# Patient Record
Sex: FEMALE | Race: WHITE | Employment: FULL TIME | URBAN - NONMETROPOLITAN AREA
[De-identification: names, ages, dates, MRNs, and addresses within clinical notes are randomized per-mention and may not be internally consistent; named-entity substitution may affect disease eponyms.]

---

## 2017-06-05 ENCOUNTER — OFFICE VISIT (OUTPATIENT)
Dept: FAMILY MEDICINE CLINIC | Age: 52
End: 2017-06-05
Payer: COMMERCIAL

## 2017-06-05 VITALS
DIASTOLIC BLOOD PRESSURE: 70 MMHG | RESPIRATION RATE: 12 BRPM | HEART RATE: 56 BPM | BODY MASS INDEX: 20.81 KG/M2 | HEIGHT: 67 IN | SYSTOLIC BLOOD PRESSURE: 122 MMHG | WEIGHT: 132.6 LBS

## 2017-06-05 DIAGNOSIS — R87.613 HGSIL (HIGH GRADE SQUAMOUS INTRAEPITHELIAL LESION) ON PAP SMEAR OF CERVIX: ICD-10-CM

## 2017-06-05 DIAGNOSIS — F33.42 RECURRENT MAJOR DEPRESSIVE DISORDER, IN FULL REMISSION (HCC): Primary | ICD-10-CM

## 2017-06-05 DIAGNOSIS — K58.0 IRRITABLE BOWEL SYNDROME WITH DIARRHEA: ICD-10-CM

## 2017-06-05 DIAGNOSIS — I83.93 VARICOSE VEINS OF BOTH LOWER EXTREMITIES: ICD-10-CM

## 2017-06-05 DIAGNOSIS — F41.9 ANXIETY: ICD-10-CM

## 2017-06-05 PROCEDURE — 99214 OFFICE O/P EST MOD 30 MIN: CPT | Performed by: FAMILY MEDICINE

## 2017-06-05 ASSESSMENT — PATIENT HEALTH QUESTIONNAIRE - PHQ9
SUM OF ALL RESPONSES TO PHQ QUESTIONS 1-9: 0
SUM OF ALL RESPONSES TO PHQ9 QUESTIONS 1 & 2: 0
2. FEELING DOWN, DEPRESSED OR HOPELESS: 0
1. LITTLE INTEREST OR PLEASURE IN DOING THINGS: 0

## 2017-06-05 ASSESSMENT — ENCOUNTER SYMPTOMS
EYES NEGATIVE: 1
RESPIRATORY NEGATIVE: 1
GASTROINTESTINAL NEGATIVE: 1
ALLERGIC/IMMUNOLOGIC NEGATIVE: 1

## 2017-06-13 ENCOUNTER — OFFICE VISIT (OUTPATIENT)
Dept: OPTOMETRY | Age: 52
End: 2017-06-13
Payer: COMMERCIAL

## 2017-06-13 DIAGNOSIS — H00.014 HORDEOLUM EXTERNUM OF LEFT UPPER EYELID: Primary | ICD-10-CM

## 2017-06-13 DIAGNOSIS — H52.4 MYOPIA OF BOTH EYES WITH ASTIGMATISM AND PRESBYOPIA: ICD-10-CM

## 2017-06-13 DIAGNOSIS — H52.13 MYOPIA OF BOTH EYES WITH ASTIGMATISM AND PRESBYOPIA: ICD-10-CM

## 2017-06-13 DIAGNOSIS — H52.203 MYOPIA OF BOTH EYES WITH ASTIGMATISM AND PRESBYOPIA: ICD-10-CM

## 2017-06-13 PROCEDURE — 99212 OFFICE O/P EST SF 10 MIN: CPT | Performed by: OPTOMETRIST

## 2017-06-13 RX ORDER — BENOXINATE HCL/FLUORESCEIN SOD 0.4%-0.25%
1 DROPS OPHTHALMIC (EYE) ONCE
Status: COMPLETED | OUTPATIENT
Start: 2017-06-13 | End: 2017-06-13

## 2017-06-13 RX ADMIN — Medication 1 DROP: at 16:19

## 2017-06-13 ASSESSMENT — TONOMETRY
IOP_METHOD: APPLANATION W FLURESS DROP
OS_IOP_MMHG: 16
OD_IOP_MMHG: 16

## 2017-06-13 ASSESSMENT — SLIT LAMP EXAM - LIDS: COMMENTS: NORMAL

## 2017-06-13 ASSESSMENT — REFRACTION_MANIFEST
OD_CYLINDER: -0.25
OS_SPHERE: PLANO
OD_ADD: +2.00
OD_AXIS: 135
OS_CYLINDER: -1.00
OS_ADD: +2.00
OS_AXIS: 175
OD_SPHERE: -0.25

## 2017-06-13 ASSESSMENT — VISUAL ACUITY
CORRECTION_TYPE: GLASSES
OS_CC: 20/20
OD_CC: 20/20 OU
METHOD: SNELLEN - LINEAR
OS_CC+: -2

## 2017-06-13 ASSESSMENT — REFRACTION_WEARINGRX
SPECS_TYPE: PAL
OD_CYLINDER: -0.25
OD_AXIS: 127
OD_SPHERE: -0.25
OS_SPHERE: PLANO
OD_ADD: +2.00
OS_AXIS: 178
OS_CYLINDER: -1.00
OS_ADD: +2.00

## 2017-08-14 ENCOUNTER — TELEPHONE (OUTPATIENT)
Dept: OBGYN | Age: 52
End: 2017-08-14

## 2017-10-18 ENCOUNTER — OFFICE VISIT (OUTPATIENT)
Dept: OBGYN | Age: 52
End: 2017-10-18
Payer: COMMERCIAL

## 2017-10-18 VITALS
HEART RATE: 68 BPM | SYSTOLIC BLOOD PRESSURE: 118 MMHG | WEIGHT: 130.8 LBS | DIASTOLIC BLOOD PRESSURE: 84 MMHG | BODY MASS INDEX: 20.53 KG/M2 | HEIGHT: 67 IN

## 2017-10-18 DIAGNOSIS — Z12.31 SCREENING MAMMOGRAM, ENCOUNTER FOR: ICD-10-CM

## 2017-10-18 DIAGNOSIS — Z01.419 WELL FEMALE EXAM WITH ROUTINE GYNECOLOGICAL EXAM: Primary | ICD-10-CM

## 2017-10-18 DIAGNOSIS — Z78.0 MENOPAUSE: ICD-10-CM

## 2017-10-18 DIAGNOSIS — Z13.220 SCREENING FOR LIPOID DISORDERS: ICD-10-CM

## 2017-10-18 DIAGNOSIS — N95.2 VAGINAL ATROPHY: ICD-10-CM

## 2017-10-18 DIAGNOSIS — Z13.29 SCREENING FOR THYROID DISORDER: ICD-10-CM

## 2017-10-18 PROCEDURE — 99396 PREV VISIT EST AGE 40-64: CPT | Performed by: NURSE PRACTITIONER

## 2017-10-18 NOTE — PROGRESS NOTES
Rowan Acuna  10/18/2017              45 y.o. Chief Complaint   Patient presents with    Gynecologic Exam     annual exam and pap         No LMP recorded (lmp unknown). Patient has had a hysterectomy. No referring provider defined for this encounter. HPI :Annual Exam  Patient presents for annual exam.  Counseling on healthy lifestyle reviewed, as well as the need for self breast exam. We reviewed the need for Kegal exercises. We discussed and reviewed the need for routine screenings and immunization updates when appropriate. Vaginal dryness and urge incontinence becoming more of a problem. Performs monthly self breast exams. The patient is sexually active. last pap: was normal, last mammogram: was normal  The patient has regular exercise: 3-4 times per week . We did review the need for and frequency of both weight bearing and strengthening as tolerated by the patient. ________________________________________________________________________  Obstetric History       T3      L2     SAB0   TAB0   Ectopic0   Molar0   Multiple0   Live Births3       # Outcome Date GA Lbr Judd/2nd Weight Sex Delivery Anes PTL Lv   3 Term 01 40w0d  6 lb 8 oz (2.948 kg) F Vag-Spont   DAVIDSON      Name: Ramu Garcia   2 Term 84 40w0d  9 lb 6 oz (4.252 kg) M Vag-Spont   DEC      Name: Charisse [employer]   1 Term 02/15/81 40w0d  8 lb 8 oz (3.856 kg) F Vag-Spont   DAVIDSON      Name: diane        Past Medical History:   Diagnosis Date    Basal cell carcinoma     right shoulder    Depression     IBS (irritable bowel syndrome)     Low grade squamous intraepithelial lesion     converting to HGSIL 5/15.     Migraine     PTSD (post-traumatic stress disorder)     Rectocele     Varicose vein                                                                    Past Surgical History:   Procedure Laterality Date    CHOLECYSTECTOMY, LAPAROSCOPIC      COLONOSCOPY  2012    COLONOSCOPY  2014    internal hemorrhoids Dr Nikkie Nance  01/29/2010    ENDOMETRIAL ABLATION      HYSTERECTOMY, TOTAL ABDOMINAL  08/20/2015    Due to pap with HGSIL    LEEP      OTHER SURGICAL HISTORY      Schlerotherapy    POSTERIOR CRUCIATE LIGAMENT REPAIR      RECTOCELE REPAIR  2013    UPPER GASTROINTESTINAL ENDOSCOPY  02/15/2006     Family History   Problem Relation Age of Onset    Glaucoma Neg Hx     Diabetes Neg Hx     Cataracts Neg Hx      Social History     Social History    Marital status:      Spouse name: N/A    Number of children: N/A    Years of education: N/A     Occupational History    Not on file. Social History Main Topics    Smoking status: Never Smoker    Smokeless tobacco: Never Used    Alcohol use 0.0 oz/week      Comment: SOCIALLY    Drug use: No    Sexual activity: Yes     Partners: Male     Other Topics Concern    Not on file     Social History Narrative    No narrative on file       MEDICATIONS:  Current Outpatient Prescriptions   Medication Sig Dispense Refill    escitalopram (LEXAPRO) 10 MG tablet TAKE 1 TABLET DAILY 90 tablet 3     No current facility-administered medications for this visit. ALLERGIES:  Allergies as of 10/18/2017 - Review Complete 10/18/2017   Allergen Reaction Noted    Penicillins Rash 09/10/2013           Gynecologic History:  Menarche: 13 yo  Menopause at 50's yo     No LMP recorded (lmp unknown). Patient has had a hysterectomy. Hormone Exposure: No    Family History of Breast, Ovarian , Colon or Uterine Cancer: No     Preventative Health Testing:  Date of Last Pap Smear: 2016  Date of Last Mammogram: 2016  Date of Last Colonoscopy: 2014  Date of Last Bone Density: none  ________________________________________________________________________  REVIEW OF SYSTEMS:     A minimum of an eleven point review of systems was completed.     Review Of Systems (11 point):  General ROS:  negative  Hematological and Lymphatic (post-traumatic stress disorder)     Rectocele     Varicose vein          Patient Active Problem List   Diagnosis    Migraine headache    Dysmenorrhea    Depression    Post traumatic stress disorder (PTSD)    LSIL (low grade squamous intraepithelial lesion) on Pap smear    Rectocele    Venous insufficiency (chronic) (peripheral)    Spider vein, symptomatic    Myopia of both eyes with astigmatism and presbyopia    Varicose veins of both lower extremities    IBS (irritable bowel syndrome)          Hereditary Breast, Ovarian, Colon and Uterine Cancer screening Done. Tobacco & Secondary smoke risks reviewed; instructed on cessation and avoidance    PLAN:  No Follow-up on file. Repeat pap per ASCCP 2013 guidelines  Return for annual exams  Mammograms every 1 year. If 35 yo and last mammogram was negative. Routine health maintenance per patients PCP.   Orders Placed This Encounter   Procedures    GILLIAN DIGITAL SCREEN W CAD BILATERAL     Standing Status:   Future     Standing Expiration Date:   5/18/2019     Order Specific Question:   Reason for exam:     Answer:   screening    DEXA Bone Density 2 Sites     Standing Status:   Future     Standing Expiration Date:   4/18/2019     Order Specific Question:   Reason for exam:     Answer:   menopause    Vitamin D 25 Hydroxy     Standing Status:   Future     Standing Expiration Date:   10/18/2018    CBC Auto Differential     Standing Status:   Future     Standing Expiration Date:   1/26/2019    Comprehensive Metabolic Panel     Standing Status:   Future     Standing Expiration Date:   1/26/2019    Lipid Panel     Standing Status:   Future     Standing Expiration Date:   1/26/2019     Order Specific Question:   Is Patient Fasting?/# of Hours     Answer:   yes    TSH with Reflex     Standing Status:   Future     Standing Expiration Date:   1/26/2019       Bebo Buenrostro  10/18/2017      (Please note that portions of this note were completed with a

## 2017-10-21 ENCOUNTER — HOSPITAL ENCOUNTER (OUTPATIENT)
Dept: LAB | Age: 52
Setting detail: SPECIMEN
Discharge: HOME | End: 2017-10-21
Payer: COMMERCIAL

## 2017-10-21 DIAGNOSIS — Z13.220 SCREENING FOR LIPOID DISORDERS: ICD-10-CM

## 2017-10-21 DIAGNOSIS — Z13.29 SCREENING FOR THYROID DISORDER: ICD-10-CM

## 2017-10-21 DIAGNOSIS — Z01.419 WELL FEMALE EXAM WITH ROUTINE GYNECOLOGICAL EXAM: ICD-10-CM

## 2017-10-21 LAB
ABSOLUTE EOS #: 0 K/UL (ref 0–0.4)
ABSOLUTE IMMATURE GRANULOCYTE: ABNORMAL K/UL (ref 0–0.3)
ABSOLUTE LYMPH #: 1.1 K/UL (ref 1–4.8)
ABSOLUTE MONO #: 0.2 K/UL (ref 0.1–1.2)
ALBUMIN SERPL-MCNC: 4.7 G/DL (ref 3.5–5.2)
ALBUMIN/GLOBULIN RATIO: 1.7 (ref 1–2.5)
ALP BLD-CCNC: 91 U/L (ref 35–104)
ALT SERPL-CCNC: 20 U/L (ref 5–33)
ANION GAP SERPL CALCULATED.3IONS-SCNC: 16 MMOL/L (ref 9–17)
AST SERPL-CCNC: 23 U/L
BASOPHILS # BLD: 1 % (ref 0–1)
BASOPHILS ABSOLUTE: 0 K/UL (ref 0–0.2)
BILIRUB SERPL-MCNC: 0.47 MG/DL (ref 0.3–1.2)
BUN BLDV-MCNC: 15 MG/DL (ref 6–20)
BUN/CREAT BLD: 16 (ref 9–20)
CALCIUM SERPL-MCNC: 9.8 MG/DL (ref 8.6–10.4)
CHLORIDE BLD-SCNC: 104 MMOL/L (ref 98–107)
CHOLESTEROL/HDL RATIO: 2.4
CHOLESTEROL: 220 MG/DL
CO2: 26 MMOL/L (ref 20–31)
CREAT SERPL-MCNC: 0.94 MG/DL (ref 0.5–0.9)
DIFFERENTIAL TYPE: ABNORMAL
EOSINOPHILS RELATIVE PERCENT: 1 % (ref 1–7)
GFR AFRICAN AMERICAN: >60 ML/MIN
GFR NON-AFRICAN AMERICAN: >60 ML/MIN
GFR SERPL CREATININE-BSD FRML MDRD: ABNORMAL ML/MIN/{1.73_M2}
GFR SERPL CREATININE-BSD FRML MDRD: ABNORMAL ML/MIN/{1.73_M2}
GLUCOSE BLD-MCNC: 89 MG/DL (ref 70–99)
HCT VFR BLD CALC: 41.5 % (ref 36–46)
HDLC SERPL-MCNC: 90 MG/DL
HEMOGLOBIN: 14 G/DL (ref 12–16)
IMMATURE GRANULOCYTES: ABNORMAL %
LDL CHOLESTEROL: 117 MG/DL (ref 0–130)
LYMPHOCYTES # BLD: 36 % (ref 16–46)
MCH RBC QN AUTO: 31 PG (ref 26–34)
MCHC RBC AUTO-ENTMCNC: 33.9 G/DL (ref 31–37)
MCV RBC AUTO: 91.7 FL (ref 80–100)
MONOCYTES # BLD: 7 % (ref 4–11)
PDW BLD-RTO: 12.8 % (ref 11–14.5)
PLATELET # BLD: 207 K/UL (ref 140–450)
PLATELET ESTIMATE: ABNORMAL
PMV BLD AUTO: 9.4 FL (ref 6–12)
POTASSIUM SERPL-SCNC: 4.5 MMOL/L (ref 3.7–5.3)
RBC # BLD: 4.52 M/UL (ref 4–5.2)
RBC # BLD: ABNORMAL 10*6/UL
SEG NEUTROPHILS: 55 % (ref 43–77)
SEGMENTED NEUTROPHILS ABSOLUTE COUNT: 1.7 K/UL (ref 1.8–7.7)
SODIUM BLD-SCNC: 146 MMOL/L (ref 135–144)
TOTAL PROTEIN: 7.4 G/DL (ref 6.4–8.3)
TRIGL SERPL-MCNC: 67 MG/DL
TSH SERPL DL<=0.05 MIU/L-ACNC: 2.35 MIU/L (ref 0.3–5)
VITAMIN D 25-HYDROXY: 26 NG/ML (ref 30–100)
VLDLC SERPL CALC-MCNC: ABNORMAL MG/DL (ref 1–30)
WBC # BLD: 3.1 K/UL (ref 3.5–11)
WBC # BLD: ABNORMAL 10*3/UL

## 2017-10-21 PROCEDURE — 84443 ASSAY THYROID STIM HORMONE: CPT

## 2017-10-21 PROCEDURE — 80053 COMPREHEN METABOLIC PANEL: CPT

## 2017-10-21 PROCEDURE — 36415 COLL VENOUS BLD VENIPUNCTURE: CPT

## 2017-10-21 PROCEDURE — 80061 LIPID PANEL: CPT

## 2017-10-21 PROCEDURE — 85025 COMPLETE CBC W/AUTO DIFF WBC: CPT

## 2017-10-21 PROCEDURE — 82306 VITAMIN D 25 HYDROXY: CPT

## 2017-10-22 DIAGNOSIS — E55.9 VITAMIN D DEFICIENCY: Primary | ICD-10-CM

## 2017-12-05 ENCOUNTER — OFFICE VISIT (OUTPATIENT)
Dept: OBGYN | Age: 52
End: 2017-12-05
Payer: COMMERCIAL

## 2017-12-05 ENCOUNTER — HOSPITAL ENCOUNTER (OUTPATIENT)
Age: 52
Setting detail: SPECIMEN
Discharge: HOME | End: 2017-12-05
Payer: COMMERCIAL

## 2017-12-05 VITALS
DIASTOLIC BLOOD PRESSURE: 70 MMHG | WEIGHT: 130 LBS | HEIGHT: 67 IN | BODY MASS INDEX: 20.4 KG/M2 | HEART RATE: 64 BPM | SYSTOLIC BLOOD PRESSURE: 108 MMHG

## 2017-12-05 DIAGNOSIS — N95.2 VAGINAL ATROPHY: ICD-10-CM

## 2017-12-05 DIAGNOSIS — Z12.72 SCREENING FOR VAGINAL CANCER: ICD-10-CM

## 2017-12-05 DIAGNOSIS — Z12.72 SCREENING FOR VAGINAL CANCER: Primary | ICD-10-CM

## 2017-12-05 PROCEDURE — 99213 OFFICE O/P EST LOW 20 MIN: CPT | Performed by: NURSE PRACTITIONER

## 2017-12-05 PROCEDURE — G0145 SCR C/V CYTO,THINLAYER,RESCR: HCPCS

## 2017-12-05 PROCEDURE — 87624 HPV HI-RISK TYP POOLED RSLT: CPT

## 2017-12-05 RX ORDER — ESTRADIOL 0.1 MG/G
CREAM VAGINAL
Qty: 1 TUBE | Refills: 3 | Status: SHIPPED | OUTPATIENT
Start: 2017-12-05 | End: 2018-12-10 | Stop reason: SDUPTHER

## 2017-12-05 NOTE — PROGRESS NOTES
Subjective:  Gumaro Chopra presents for follow up of Estrace vaginal cream and cytology sampling. Vaginal atrophy noted at annual exam.  Patient also complained of vaginal dryness. Has been using estrace cream 1 gm twice weekly and has noted improvement in dryness. Denies side effects or warning signs and wishes to continue with the same. We reviewed the pros, cons, risks, benefits, side effects and proper compliance. Patient verbalized understanding. Patient Active Problem List   Diagnosis    Migraine headache    Dysmenorrhea    Depression    Post traumatic stress disorder (PTSD)    LSIL (low grade squamous intraepithelial lesion) on Pap smear    Rectocele    Venous insufficiency (chronic) (peripheral)    Spider vein, symptomatic    Myopia of both eyes with astigmatism and presbyopia    Varicose veins of both lower extremities    IBS (irritable bowel syndrome)     Problem list reviewed as part of this encounter. Medication list reviewed and updated. See nursing note. History of present illness reviewed in detail and expanded by me. REVIEW OF SYSTEMS:     A minimum of an eleven point review of systems was completed. Review Of Systems (11 point):  General ROS:  negative  Hematological and Lymphatic ROS:negative   Breast ROS: negative  Cardiovascular ROS: negative  Respiratory ROS: negative   Gastrointestinal ROS: negative  Genito-Urinary ROS: positive for vaginal atrophy  Psychological ROS: negative  Neurological ROS: negative  Musculoskeletal ROS: negative  Dermatological ROS: negative                                                                                                                                          Objective:  Vitals:    12/05/17 1255   BP: 108/70   Pulse: 64     Alert and oriented. Abdomen: Soft, non-tender, no masses.  No CVA tenderness  External Genetalia: Normal appearance of vulva,  Bartholin Glands, urethra, and Oviedo's ducts  Vagina: Normal appearance of mucosa and rugae. No unusual discharge. Atrophy markedly improved  Cervix: Surgically absent  Uterus: Surgically absent  Bimanual exam not repeated  Thin prep Pap obtained from vaginal apex         Assessment:  Encounter Diagnosis   Name Primary?  Cervical cancer screening Yes       Plan:  See orders. Orders Placed This Encounter   Procedures    PAP SMEAR     Patient History:    No LMP recorded (lmp unknown). Patient has had a hysterectomy. OBGYN Status: Hysterectomy  Past Surgical History:  2006: CHOLECYSTECTOMY, LAPAROSCOPIC  06/22/2012: COLONOSCOPY  8/25/2014: COLONOSCOPY      Comment: internal hemorrhoids Dr Sharda Massey [facility]  01/29/2010: [address] [facility]  No date: ENDOMETRIAL ABLATION  08/20/2015: HYSTERECTOMY, TOTAL ABDOMINAL      Comment: Due to pap with HGSIL  No date: LEEP  No date: OTHER SURGICAL HISTORY      Comment: Schlerotherapy  No date: POSTERIOR CRUCIATE LIGAMENT REPAIR  2013: Jamel 72  02/15/2006: UPPER GASTROINTESTINAL ENDOSCOPY      Smoking status: Never Smoker                                                              Smokeless tobacco: Never Used                           Standing Status:   Future     Standing Expiration Date:   12/5/2018     Order Specific Question:   Collection Type     Answer: Thin Prep     Order Specific Question:   Prior Abnormal Pap Test     Answer:   No     Order Specific Question:   Screening or Diagnostic     Answer:   Screening     Order Specific Question:   HPV Requested? Answer:   Yes - If Abnormal Reflex HPV     Order Specific Question:   High Risk Patient     Answer:   N/A     New Prescriptions    ESTRADIOL (ESTRACE VAGINAL) 0.1 MG/GM VAGINAL CREAM    Apply one gm twice weekly per vagina at bedtime     There are no Patient Instructions on file for this visit.     (Please note that portions of this note were completed with a voice-recognition program. Efforts were made to edit the dictations but occasionally words are

## 2017-12-13 LAB
CYTOLOGY REPORT: NORMAL
HPV SAMPLE: ABNORMAL
HPV SOURCE: ABNORMAL
HPV, GENOTYPE 16: NOT DETECTED
HPV, GENOTYPE 18: NOT DETECTED
HPV, HIGH RISK OTHER: DETECTED
HPV, INTERPRETATION: ABNORMAL

## 2017-12-28 ENCOUNTER — HOSPITAL ENCOUNTER (OUTPATIENT)
Dept: BONE DENSITY | Age: 52
Discharge: HOME | End: 2017-12-28
Payer: COMMERCIAL

## 2017-12-28 DIAGNOSIS — Z78.0 MENOPAUSE: ICD-10-CM

## 2017-12-28 PROCEDURE — 77080 DXA BONE DENSITY AXIAL: CPT

## 2018-01-02 ENCOUNTER — HOSPITAL ENCOUNTER (OUTPATIENT)
Dept: MAMMOGRAPHY | Age: 53
Discharge: HOME | End: 2018-01-02
Payer: COMMERCIAL

## 2018-01-02 ENCOUNTER — APPOINTMENT (OUTPATIENT)
Dept: BONE DENSITY | Age: 53
End: 2018-01-02
Payer: COMMERCIAL

## 2018-01-02 DIAGNOSIS — Z12.31 SCREENING MAMMOGRAM, ENCOUNTER FOR: ICD-10-CM

## 2018-01-02 PROCEDURE — 77063 BREAST TOMOSYNTHESIS BI: CPT

## 2018-01-22 ENCOUNTER — OFFICE VISIT (OUTPATIENT)
Dept: FAMILY MEDICINE CLINIC | Age: 53
End: 2018-01-22
Payer: COMMERCIAL

## 2018-01-22 VITALS
HEIGHT: 67 IN | DIASTOLIC BLOOD PRESSURE: 68 MMHG | WEIGHT: 131 LBS | SYSTOLIC BLOOD PRESSURE: 110 MMHG | HEART RATE: 64 BPM | BODY MASS INDEX: 20.56 KG/M2

## 2018-01-22 DIAGNOSIS — R87.613 HGSIL (HIGH GRADE SQUAMOUS INTRAEPITHELIAL LESION) ON PAP SMEAR OF CERVIX: ICD-10-CM

## 2018-01-22 DIAGNOSIS — K55.1 SUPERIOR MESENTERIC ARTERY SYNDROME (HCC): ICD-10-CM

## 2018-01-22 DIAGNOSIS — R31.21 ASYMPTOMATIC MICROSCOPIC HEMATURIA: Primary | ICD-10-CM

## 2018-01-22 PROCEDURE — 99214 OFFICE O/P EST MOD 30 MIN: CPT | Performed by: FAMILY MEDICINE

## 2018-01-22 RX ORDER — ESCITALOPRAM OXALATE 10 MG/1
TABLET ORAL
Qty: 90 TABLET | Refills: 3 | Status: SHIPPED | OUTPATIENT
Start: 2018-01-22 | End: 2020-01-13

## 2018-01-22 RX ORDER — UREA 10 %
800 LOTION (ML) TOPICAL DAILY
COMMUNITY

## 2018-01-22 ASSESSMENT — ENCOUNTER SYMPTOMS
RESPIRATORY NEGATIVE: 1
EYES NEGATIVE: 1
ALLERGIC/IMMUNOLOGIC NEGATIVE: 1
GASTROINTESTINAL NEGATIVE: 1

## 2018-01-22 NOTE — PROGRESS NOTES
Subjective:      Patient ID: Anaya Frank is a 45 y.o. female. HPI acute follow up from recent ER visit at [facility]. She presented with gross hematuria prompting visit. Brown discoloration the second void. The look of things made her think of stool in the urine. Urine with blood noted on ua. She had a CT of the abdomen and pelvis that day, \"There is mild to moderate compression of the left renal vein and the SMA and the aorta.    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 No evidence of kidney or urinary bladder mass. \"  Incidental finding, not likely symptomatic long term. Past Medical History:   Diagnosis Date    Basal cell carcinoma     right shoulder    Depression     IBS (irritable bowel syndrome)     Low grade squamous intraepithelial lesion     converting to HGSIL 5/15.     Migraine     PTSD (post-traumatic stress disorder)     Rectocele     Varicose vein      Past Surgical History:   Procedure Laterality Date    CHOLECYSTECTOMY, LAPAROSCOPIC  2006    COLONOSCOPY  06/22/2012    COLONOSCOPY  8/25/2014    internal hemorrhoids Dr Brian Simms [facility]    DILATION AND CURETTAGE OF UTERUS  01/29/2010    ENDOMETRIAL ABLATION      HYSTERECTOMY, TOTAL ABDOMINAL  08/20/2015    Due to pap with HGSIL    LEEP      OTHER SURGICAL HISTORY      Schlerotherapy    POSTERIOR CRUCIATE LIGAMENT REPAIR      RECTOCELE REPAIR  2013    UPPER GASTROINTESTINAL ENDOSCOPY  02/15/2006       Current Outpatient Prescriptions   Medication Sig Dispense Refill    vitamin D (CHOLECALCIFEROL) 1000 UNIT TABS tablet Take 1,000 Units by mouth daily      folic acid (FOLVITE) 177 MCG tablet Take 800 mcg by mouth daily      estradiol (ESTRACE VAGINAL) 0.1 MG/GM vaginal cream Apply one gm twice weekly per vagina at bedtime 1 Tube 3    escitalopram (LEXAPRO) 10 MG tablet TAKE 1 TABLET DAILY 90 tablet 3     No current

## 2018-01-23 ENCOUNTER — TELEPHONE (OUTPATIENT)
Dept: FAMILY MEDICINE CLINIC | Age: 53
End: 2018-01-23

## 2018-01-23 ENCOUNTER — HOSPITAL ENCOUNTER (OUTPATIENT)
Dept: LAB | Age: 53
Setting detail: SPECIMEN
Discharge: HOME | End: 2018-01-23
Payer: COMMERCIAL

## 2018-01-23 DIAGNOSIS — K55.1 MESENTERIC VASCULAR INSUFFICIENCY (HCC): Primary | ICD-10-CM

## 2018-01-23 DIAGNOSIS — R31.21 ASYMPTOMATIC MICROSCOPIC HEMATURIA: ICD-10-CM

## 2018-01-23 LAB
-: NORMAL
AMORPHOUS: NORMAL
BACTERIA: NORMAL
BILIRUBIN URINE: NEGATIVE
CASTS UA: NORMAL /LPF (ref 0–2)
COLOR: NORMAL
COMMENT UA: NORMAL
CRYSTALS, UA: NORMAL /HPF
EPITHELIAL CELLS UA: NORMAL /HPF (ref 0–5)
GLUCOSE URINE: NEGATIVE
KETONES, URINE: NEGATIVE
LEUKOCYTE ESTERASE, URINE: NEGATIVE
MUCUS: NORMAL
NITRITE, URINE: NEGATIVE
OTHER OBSERVATIONS UA: NORMAL
PH UA: 5 (ref 5–6)
PROTEIN UA: NEGATIVE
RBC UA: NORMAL /HPF (ref 0–4)
RENAL EPITHELIAL, UA: NORMAL /HPF
SPECIFIC GRAVITY UA: 1.01 (ref 1.01–1.02)
TRICHOMONAS: NORMAL
TURBIDITY: NORMAL
URINE HGB: NEGATIVE
UROBILINOGEN, URINE: NORMAL
WBC UA: NORMAL /HPF (ref 0–4)
YEAST: NORMAL

## 2018-01-23 PROCEDURE — 81001 URINALYSIS AUTO W/SCOPE: CPT

## 2018-01-23 NOTE — TELEPHONE ENCOUNTER
Per our Gen Surg Department, Please place external referral for Vascular as patient would like to go to [facility] to see Dr. Elyssa Cervantes if possible. She does not wish to see new providers here.

## 2018-02-01 RX ORDER — ESCITALOPRAM OXALATE 10 MG/1
TABLET ORAL
Qty: 90 TABLET | Refills: 3 | Status: SHIPPED | OUTPATIENT
Start: 2018-02-01 | End: 2018-07-18 | Stop reason: SDUPTHER

## 2018-03-20 DIAGNOSIS — F41.9 ANXIETY: Primary | ICD-10-CM

## 2018-03-20 DIAGNOSIS — M62.838 NECK MUSCLE SPASM: ICD-10-CM

## 2018-03-22 RX ORDER — CYCLOBENZAPRINE HCL 10 MG
10 TABLET ORAL 3 TIMES DAILY PRN
Qty: 20 TABLET | Refills: 0 | Status: SHIPPED | OUTPATIENT
Start: 2018-03-22 | End: 2018-10-12

## 2018-03-22 RX ORDER — LORAZEPAM 0.5 MG/1
0.5 TABLET ORAL EVERY 8 HOURS PRN
Qty: 30 TABLET | Refills: 0 | Status: SHIPPED | OUTPATIENT
Start: 2018-03-22 | End: 2018-04-21

## 2018-07-18 ENCOUNTER — HOSPITAL ENCOUNTER (OUTPATIENT)
Dept: LAB | Age: 53
Setting detail: SPECIMEN
Discharge: HOME | End: 2018-07-18
Payer: COMMERCIAL

## 2018-07-18 ENCOUNTER — OFFICE VISIT (OUTPATIENT)
Dept: OBGYN | Age: 53
End: 2018-07-18
Payer: COMMERCIAL

## 2018-07-18 VITALS
HEART RATE: 72 BPM | WEIGHT: 131 LBS | RESPIRATION RATE: 16 BRPM | BODY MASS INDEX: 20.56 KG/M2 | SYSTOLIC BLOOD PRESSURE: 122 MMHG | DIASTOLIC BLOOD PRESSURE: 60 MMHG | HEIGHT: 67 IN

## 2018-07-18 DIAGNOSIS — R87.811 ATYPICAL SQUAMOUS CELL CHANGES OF UNDETERMINED SIGNIFICANCE (ASCUS) ON VAGINAL CYTOLOGY WITH POSITIVE HIGH RISK HUMAN PAPILLOMA VIRUS (HPV): ICD-10-CM

## 2018-07-18 DIAGNOSIS — E55.9 VITAMIN D DEFICIENCY: ICD-10-CM

## 2018-07-18 DIAGNOSIS — R87.620 ATYPICAL SQUAMOUS CELL CHANGES OF UNDETERMINED SIGNIFICANCE (ASCUS) ON VAGINAL CYTOLOGY WITH POSITIVE HIGH RISK HUMAN PAPILLOMA VIRUS (HPV): Primary | ICD-10-CM

## 2018-07-18 DIAGNOSIS — R87.811 ATYPICAL SQUAMOUS CELL CHANGES OF UNDETERMINED SIGNIFICANCE (ASCUS) ON VAGINAL CYTOLOGY WITH POSITIVE HIGH RISK HUMAN PAPILLOMA VIRUS (HPV): Primary | ICD-10-CM

## 2018-07-18 DIAGNOSIS — R87.620 ATYPICAL SQUAMOUS CELL CHANGES OF UNDETERMINED SIGNIFICANCE (ASCUS) ON VAGINAL CYTOLOGY WITH POSITIVE HIGH RISK HUMAN PAPILLOMA VIRUS (HPV): ICD-10-CM

## 2018-07-18 PROCEDURE — 36415 COLL VENOUS BLD VENIPUNCTURE: CPT

## 2018-07-18 PROCEDURE — 88142 CYTOPATH C/V THIN LAYER: CPT

## 2018-07-18 PROCEDURE — 82306 VITAMIN D 25 HYDROXY: CPT

## 2018-07-18 PROCEDURE — 99213 OFFICE O/P EST LOW 20 MIN: CPT | Performed by: NURSE PRACTITIONER

## 2018-07-19 LAB — VITAMIN D 25-HYDROXY: 31.7 NG/ML (ref 30–100)

## 2018-07-23 ENCOUNTER — OFFICE VISIT (OUTPATIENT)
Dept: FAMILY MEDICINE CLINIC | Age: 53
End: 2018-07-23

## 2018-07-23 VITALS
HEART RATE: 60 BPM | HEIGHT: 67 IN | WEIGHT: 133.6 LBS | BODY MASS INDEX: 20.97 KG/M2 | SYSTOLIC BLOOD PRESSURE: 132 MMHG | RESPIRATION RATE: 12 BRPM | DIASTOLIC BLOOD PRESSURE: 64 MMHG

## 2018-07-23 DIAGNOSIS — F41.9 ANXIETY: ICD-10-CM

## 2018-07-23 DIAGNOSIS — F41.9 ANXIETY: Primary | ICD-10-CM

## 2018-07-23 RX ORDER — LORAZEPAM 0.5 MG/1
0.5 TABLET ORAL EVERY 8 HOURS PRN
Qty: 30 TABLET | Refills: 0 | Status: CANCELLED | OUTPATIENT
Start: 2018-07-23 | End: 2018-08-22

## 2018-07-23 ASSESSMENT — PATIENT HEALTH QUESTIONNAIRE - PHQ9
SUM OF ALL RESPONSES TO PHQ9 QUESTIONS 1 & 2: 0
SUM OF ALL RESPONSES TO PHQ QUESTIONS 1-9: 0
2. FEELING DOWN, DEPRESSED OR HOPELESS: 0
1. LITTLE INTEREST OR PLEASURE IN DOING THINGS: 0

## 2018-07-23 NOTE — PROGRESS NOTES
[facility] from [address] [place] [address] [place] reviewed, last filled 10/20/14 #30 for a 10 day supply. Report available for your review.

## 2018-07-23 NOTE — PROGRESS NOTES
Leidy received counseling on the following healthy behaviors: medication adherence  Reviewed prior labs and health maintenance  Continue current medications, diet and exercise. Discussed use, benefit, and side effects of prescribed medications. Barriers to medication compliance addressed. Patient given educational materials - see patient instructions  Was a self-tracking handout given in paper form or via [facility]? Yes    Requested Prescriptions     Pending Prescriptions Disp Refills    LORazepam (ATIVAN) 0.5 MG tablet 30 tablet 0     Sig: Take 1 tablet by mouth every 8 hours as needed for Anxiety for up to 30 days. .       All patient questions answered. Patient voiced understanding. Quality Measures    Body mass index is 20.92 kg/m². Elevated. Weight control planned discussed Healthy diet and regular exercise. BP: 132/64 Blood pressure is normal. Treatment plan consists of No treatment change needed.     Lab Results   Component Value Date    LDLCHOLESTEROL 117 10/21/2017    (goal LDL reduction with dx if diabetes is 50% LDL reduction)      PHQ Scores 7/23/2018 6/5/2017 4/29/2016   PHQ2 Score 0 0 0   PHQ9 Score 0 0 0     Interpretation of Total Score Depression Severity: 1-4 = Minimal depression, 5-9 = Mild depression, 10-14 = Moderate depression, 15-19 = Moderately severe depression, 20-27 = Severe depression

## 2018-07-24 RX ORDER — LORAZEPAM 0.5 MG/1
0.5 TABLET ORAL EVERY 8 HOURS PRN
Qty: 30 TABLET | Refills: 0 | Status: SHIPPED | OUTPATIENT
Start: 2018-07-24 | End: 2018-08-23

## 2018-08-06 LAB — CYTOLOGY REPORT: NORMAL

## 2018-08-09 ENCOUNTER — OFFICE VISIT (OUTPATIENT)
Dept: FAMILY MEDICINE CLINIC | Age: 53
End: 2018-08-09
Payer: COMMERCIAL

## 2018-08-09 VITALS
DIASTOLIC BLOOD PRESSURE: 68 MMHG | HEART RATE: 68 BPM | SYSTOLIC BLOOD PRESSURE: 116 MMHG | HEIGHT: 67 IN | WEIGHT: 130 LBS | BODY MASS INDEX: 20.4 KG/M2

## 2018-08-09 DIAGNOSIS — Z00.00 HEALTHCARE MAINTENANCE: ICD-10-CM

## 2018-08-09 DIAGNOSIS — F41.9 ANXIETY: ICD-10-CM

## 2018-08-09 DIAGNOSIS — K58.0 IRRITABLE BOWEL SYNDROME WITH DIARRHEA: ICD-10-CM

## 2018-08-09 DIAGNOSIS — G43.909 MIGRAINE WITHOUT STATUS MIGRAINOSUS, NOT INTRACTABLE, UNSPECIFIED MIGRAINE TYPE: ICD-10-CM

## 2018-08-09 DIAGNOSIS — I83.93 VARICOSE VEINS OF BOTH LOWER EXTREMITIES: ICD-10-CM

## 2018-08-09 DIAGNOSIS — F33.42 RECURRENT MAJOR DEPRESSIVE DISORDER, IN FULL REMISSION (HCC): Primary | ICD-10-CM

## 2018-08-09 DIAGNOSIS — R87.613 HGSIL (HIGH GRADE SQUAMOUS INTRAEPITHELIAL LESION) ON PAP SMEAR OF CERVIX: ICD-10-CM

## 2018-08-09 PROCEDURE — 99214 OFFICE O/P EST MOD 30 MIN: CPT | Performed by: FAMILY MEDICINE

## 2018-08-09 ASSESSMENT — PATIENT HEALTH QUESTIONNAIRE - PHQ9
SUM OF ALL RESPONSES TO PHQ QUESTIONS 1-9: 0
1. LITTLE INTEREST OR PLEASURE IN DOING THINGS: 0
SUM OF ALL RESPONSES TO PHQ9 QUESTIONS 1 & 2: 0
SUM OF ALL RESPONSES TO PHQ QUESTIONS 1-9: 0
2. FEELING DOWN, DEPRESSED OR HOPELESS: 0

## 2018-08-09 ASSESSMENT — ENCOUNTER SYMPTOMS
GASTROINTESTINAL NEGATIVE: 1
EYES NEGATIVE: 1
RESPIRATORY NEGATIVE: 1
ALLERGIC/IMMUNOLOGIC NEGATIVE: 1

## 2018-08-09 NOTE — PROGRESS NOTES
tablet Take 1 tablet by mouth 3 times daily as needed for Muscle spasms 20 tablet 0     No current facility-administered medications for this visit. Allergies   Allergen Reactions    Penicillins Rash     In past known as \"polycillin\"         Review of Systems   Constitutional: Negative. HENT: Negative. Negative for dental problem. Eyes: Negative. Respiratory: Negative. Cardiovascular: Negative. Gastrointestinal: Negative. Endocrine: Negative. Genitourinary: Negative. Musculoskeletal: Negative. Skin: Negative. Allergic/Immunologic: Negative. Neurological: Negative. Negative for headaches (no interval migraines!). Hematological: Negative. Psychiatric/Behavioral: Negative. The patient is not nervous/anxious (acute episodes reduced at present. ). Objective:   Physical Exam   Constitutional: She is oriented to person, place, and time. She appears well-developed and well-nourished. No distress. HENT:   Head: Normocephalic and atraumatic. Right Ear: External ear normal.   Left Ear: External ear normal.   Mouth/Throat: Oropharynx is clear and moist. No oropharyngeal exudate. Eyes: Conjunctivae and EOM are normal. No scleral icterus. Neck: Neck supple. No thyromegaly present. Cardiovascular: Normal rate, regular rhythm, normal heart sounds and intact distal pulses. No murmur heard. Pulmonary/Chest: Effort normal and breath sounds normal. No respiratory distress. She has no wheezes. Abdominal: Soft. Bowel sounds are normal. She exhibits no distension. There is no tenderness. There is no rebound. Musculoskeletal: Normal range of motion. She exhibits no edema or tenderness. Neurological: She is alert and oriented to person, place, and time. Skin: Skin is warm and dry. No rash noted. No erythema. Psychiatric: She has a normal mood and affect.  Her behavior is normal. Judgment and thought content normal.     /68 (Site: Right Arm, Position: Sitting,

## 2018-10-12 ENCOUNTER — OFFICE VISIT (OUTPATIENT)
Dept: PRIMARY CARE CLINIC | Age: 53
End: 2018-10-12
Payer: COMMERCIAL

## 2018-10-12 VITALS
DIASTOLIC BLOOD PRESSURE: 80 MMHG | RESPIRATION RATE: 16 BRPM | HEIGHT: 67 IN | OXYGEN SATURATION: 98 % | HEART RATE: 74 BPM | BODY MASS INDEX: 20.56 KG/M2 | TEMPERATURE: 96.9 F | WEIGHT: 131 LBS | SYSTOLIC BLOOD PRESSURE: 120 MMHG

## 2018-10-12 DIAGNOSIS — J20.8 VIRAL BRONCHITIS: Primary | ICD-10-CM

## 2018-10-12 PROCEDURE — 99213 OFFICE O/P EST LOW 20 MIN: CPT | Performed by: NURSE PRACTITIONER

## 2018-10-12 RX ORDER — BENZONATATE 200 MG/1
200 CAPSULE ORAL 3 TIMES DAILY PRN
Qty: 20 CAPSULE | Refills: 0 | Status: SHIPPED | OUTPATIENT
Start: 2018-10-12 | End: 2018-10-19

## 2018-10-12 RX ORDER — DEXTROMETHORPHAN HYDROBROMIDE AND PROMETHAZINE HYDROCHLORIDE 15; 6.25 MG/5ML; MG/5ML
5 SYRUP ORAL 4 TIMES DAILY PRN
Qty: 180 ML | Refills: 0 | Status: SHIPPED | OUTPATIENT
Start: 2018-10-12 | End: 2018-12-09

## 2018-10-12 ASSESSMENT — ENCOUNTER SYMPTOMS
WHEEZING: 0
CHEST TIGHTNESS: 1
SORE THROAT: 1
RHINORRHEA: 0
SHORTNESS OF BREATH: 0
GASTROINTESTINAL NEGATIVE: 1
COUGH: 1

## 2018-10-12 ASSESSMENT — PATIENT HEALTH QUESTIONNAIRE - PHQ9
SUM OF ALL RESPONSES TO PHQ QUESTIONS 1-9: 0
SUM OF ALL RESPONSES TO PHQ QUESTIONS 1-9: 0
1. LITTLE INTEREST OR PLEASURE IN DOING THINGS: 0
SUM OF ALL RESPONSES TO PHQ9 QUESTIONS 1 & 2: 0
2. FEELING DOWN, DEPRESSED OR HOPELESS: 0

## 2018-12-09 ENCOUNTER — HOSPITAL ENCOUNTER (OUTPATIENT)
Age: 53
Discharge: HOME | End: 2018-12-09
Payer: COMMERCIAL

## 2018-12-09 ENCOUNTER — OFFICE VISIT (OUTPATIENT)
Dept: PRIMARY CARE CLINIC | Age: 53
End: 2018-12-09
Payer: COMMERCIAL

## 2018-12-09 VITALS
RESPIRATION RATE: 16 BRPM | WEIGHT: 134 LBS | HEIGHT: 67 IN | HEART RATE: 64 BPM | OXYGEN SATURATION: 99 % | DIASTOLIC BLOOD PRESSURE: 78 MMHG | TEMPERATURE: 97.7 F | BODY MASS INDEX: 21.03 KG/M2 | SYSTOLIC BLOOD PRESSURE: 128 MMHG

## 2018-12-09 DIAGNOSIS — R30.9 PAINFUL URINATION: ICD-10-CM

## 2018-12-09 DIAGNOSIS — N30.01 ACUTE CYSTITIS WITH HEMATURIA: Primary | ICD-10-CM

## 2018-12-09 LAB
-: ABNORMAL
AMORPHOUS: ABNORMAL
BACTERIA: ABNORMAL
BILIRUBIN URINE: NEGATIVE
CASTS UA: ABNORMAL /LPF (ref 0–2)
COLOR: ABNORMAL
COMMENT UA: ABNORMAL
CRYSTALS, UA: ABNORMAL /HPF
EPITHELIAL CELLS UA: ABNORMAL /HPF (ref 0–5)
GLUCOSE URINE: NEGATIVE
KETONES, URINE: NEGATIVE
LEUKOCYTE ESTERASE, URINE: ABNORMAL
MUCUS: ABNORMAL
NITRITE, URINE: NEGATIVE
OTHER OBSERVATIONS UA: ABNORMAL
PH UA: 7 (ref 5–6)
PROTEIN UA: NEGATIVE
RBC UA: ABNORMAL /HPF (ref 0–4)
RENAL EPITHELIAL, UA: ABNORMAL /HPF
SPECIFIC GRAVITY UA: 1 (ref 1.01–1.02)
TRICHOMONAS: ABNORMAL
TURBIDITY: ABNORMAL
URINE HGB: ABNORMAL
UROBILINOGEN, URINE: NORMAL
WBC UA: ABNORMAL /HPF (ref 0–4)
YEAST: ABNORMAL

## 2018-12-09 PROCEDURE — 81001 URINALYSIS AUTO W/SCOPE: CPT

## 2018-12-09 PROCEDURE — 86403 PARTICLE AGGLUT ANTBDY SCRN: CPT

## 2018-12-09 PROCEDURE — 99213 OFFICE O/P EST LOW 20 MIN: CPT | Performed by: FAMILY MEDICINE

## 2018-12-09 PROCEDURE — 87086 URINE CULTURE/COLONY COUNT: CPT

## 2018-12-09 RX ORDER — PHENAZOPYRIDINE HYDROCHLORIDE 200 MG/1
200 TABLET, FILM COATED ORAL 3 TIMES DAILY PRN
Qty: 21 TABLET | Refills: 0 | Status: SHIPPED | OUTPATIENT
Start: 2018-12-09 | End: 2018-12-12

## 2018-12-09 RX ORDER — NITROFURANTOIN 25; 75 MG/1; MG/1
100 CAPSULE ORAL 2 TIMES DAILY
Qty: 10 CAPSULE | Refills: 0 | Status: SHIPPED | OUTPATIENT
Start: 2018-12-09 | End: 2018-12-14

## 2018-12-09 ASSESSMENT — ENCOUNTER SYMPTOMS
ALLERGIC/IMMUNOLOGIC NEGATIVE: 1
EYES NEGATIVE: 1
GASTROINTESTINAL NEGATIVE: 1
RESPIRATORY NEGATIVE: 1

## 2018-12-10 ENCOUNTER — HOSPITAL ENCOUNTER (OUTPATIENT)
Age: 53
Discharge: HOME | End: 2018-12-10
Payer: COMMERCIAL

## 2018-12-10 ENCOUNTER — OFFICE VISIT (OUTPATIENT)
Dept: OBGYN | Age: 53
End: 2018-12-10
Payer: COMMERCIAL

## 2018-12-10 VITALS
HEIGHT: 67 IN | HEART RATE: 64 BPM | WEIGHT: 135 LBS | BODY MASS INDEX: 21.19 KG/M2 | DIASTOLIC BLOOD PRESSURE: 60 MMHG | SYSTOLIC BLOOD PRESSURE: 112 MMHG

## 2018-12-10 DIAGNOSIS — R87.620 ATYPICAL SQUAMOUS CELL CHANGES OF UNDETERMINED SIGNIFICANCE (ASCUS) ON VAGINAL CYTOLOGY WITH POSITIVE HIGH RISK HUMAN PAPILLOMA VIRUS (HPV): ICD-10-CM

## 2018-12-10 DIAGNOSIS — Z12.72 SCREENING FOR VAGINAL CANCER: ICD-10-CM

## 2018-12-10 DIAGNOSIS — R87.811 ATYPICAL SQUAMOUS CELL CHANGES OF UNDETERMINED SIGNIFICANCE (ASCUS) ON VAGINAL CYTOLOGY WITH POSITIVE HIGH RISK HUMAN PAPILLOMA VIRUS (HPV): ICD-10-CM

## 2018-12-10 DIAGNOSIS — Z01.419 WELL FEMALE EXAM WITH ROUTINE GYNECOLOGICAL EXAM: Primary | ICD-10-CM

## 2018-12-10 DIAGNOSIS — Z12.31 VISIT FOR SCREENING MAMMOGRAM: ICD-10-CM

## 2018-12-10 PROCEDURE — 88142 CYTOPATH C/V THIN LAYER: CPT

## 2018-12-10 PROCEDURE — 87624 HPV HI-RISK TYP POOLED RSLT: CPT

## 2018-12-10 PROCEDURE — 99396 PREV VISIT EST AGE 40-64: CPT | Performed by: NURSE PRACTITIONER

## 2018-12-10 RX ORDER — ESTRADIOL 0.1 MG/G
CREAM VAGINAL
Qty: 1 TUBE | Refills: 3 | Status: SHIPPED | OUTPATIENT
Start: 2018-12-10 | End: 2020-01-15

## 2018-12-10 NOTE — PROGRESS NOTES
COLONOSCOPY  06/22/2012    COLONOSCOPY  8/25/2014    internal hemorrhoids Dr Marion Spears [facility]    DILATION AND CURETTAGE OF UTERUS  01/29/2010    ENDOMETRIAL ABLATION      HYSTERECTOMY, TOTAL ABDOMINAL  08/20/2015    Due to pap with HGSIL    LEEP      OTHER SURGICAL HISTORY      Schlerotherapy    POSTERIOR CRUCIATE LIGAMENT REPAIR      RECTOCELE REPAIR  2013    UPPER GASTROINTESTINAL ENDOSCOPY  02/15/2006     Family History   Problem Relation Age of Onset    Glaucoma Neg Hx     Diabetes Neg Hx     Cataracts Neg Hx      Social History     Social History    Marital status:      Spouse name: N/A    Number of children: N/A    Years of education: N/A     Occupational History    Not on file. Social History Main Topics    Smoking status: Never Smoker    Smokeless tobacco: Never Used    Alcohol use 0.0 oz/week      Comment: SOCIALLY    Drug use: No    Sexual activity: Yes     Partners: Male     Other Topics Concern    Not on file     Social History Narrative    No narrative on file       MEDICATIONS:  Current Outpatient Prescriptions   Medication Sig Dispense Refill    nitrofurantoin, macrocrystal-monohydrate, (MACROBID) 100 MG capsule Take 1 capsule by mouth 2 times daily for 5 days 10 capsule 0    phenazopyridine (PYRIDIUM) 200 MG tablet Take 1 tablet by mouth 3 times daily as needed for Pain 21 tablet 0    vitamin D (CHOLECALCIFEROL) 1000 UNIT TABS tablet Take 1,000 Units by mouth daily      folic acid (FOLVITE) 282 MCG tablet Take 800 mcg by mouth daily      escitalopram (LEXAPRO) 10 MG tablet TAKE 1 TABLET DAILY 90 tablet 3    estradiol (ESTRACE VAGINAL) 0.1 MG/GM vaginal cream Apply one gm twice weekly per vagina at bedtime 1 Tube 3     No current facility-administered medications for this visit.         ALLERGIES:  Allergies as of 12/10/2018 - Review Complete 12/10/2018   Allergen Reaction Noted    Penicillins Rash 09/10/2013    Polycillin-n  [ampicillin] Rash 01/06/2018

## 2018-12-11 LAB
CULTURE: ABNORMAL
Lab: ABNORMAL
SPECIMEN DESCRIPTION: ABNORMAL
STATUS: ABNORMAL

## 2018-12-31 LAB
HPV SAMPLE: ABNORMAL
HPV SOURCE: ABNORMAL
HPV, GENOTYPE 16: NOT DETECTED
HPV, GENOTYPE 18: NOT DETECTED
HPV, HIGH RISK OTHER: DETECTED
HPV, INTERPRETATION: ABNORMAL

## 2019-01-02 LAB — CYTOLOGY REPORT: NORMAL

## 2019-01-05 ENCOUNTER — HOSPITAL ENCOUNTER (OUTPATIENT)
Dept: MAMMOGRAPHY | Age: 54
Discharge: HOME | End: 2019-01-07
Payer: COMMERCIAL

## 2019-01-05 DIAGNOSIS — Z12.31 VISIT FOR SCREENING MAMMOGRAM: ICD-10-CM

## 2019-01-05 PROCEDURE — 77063 BREAST TOMOSYNTHESIS BI: CPT

## 2019-01-22 DIAGNOSIS — R87.811 ATYPICAL SQUAMOUS CELL CHANGES OF UNDETERMINED SIGNIFICANCE (ASCUS) ON VAGINAL CYTOLOGY WITH POSITIVE HIGH RISK HUMAN PAPILLOMA VIRUS (HPV): Primary | ICD-10-CM

## 2019-01-22 DIAGNOSIS — R87.620 ATYPICAL SQUAMOUS CELL CHANGES OF UNDETERMINED SIGNIFICANCE (ASCUS) ON VAGINAL CYTOLOGY WITH POSITIVE HIGH RISK HUMAN PAPILLOMA VIRUS (HPV): Primary | ICD-10-CM

## 2019-01-24 ENCOUNTER — HOSPITAL ENCOUNTER (OUTPATIENT)
Age: 54
Setting detail: SPECIMEN
Discharge: HOME | End: 2019-01-24
Payer: COMMERCIAL

## 2019-01-24 ENCOUNTER — PROCEDURE VISIT (OUTPATIENT)
Dept: OBGYN | Age: 54
End: 2019-01-24
Payer: COMMERCIAL

## 2019-01-24 VITALS
DIASTOLIC BLOOD PRESSURE: 80 MMHG | HEIGHT: 67 IN | SYSTOLIC BLOOD PRESSURE: 138 MMHG | BODY MASS INDEX: 20.88 KG/M2 | WEIGHT: 133 LBS | RESPIRATION RATE: 18 BRPM | HEART RATE: 88 BPM

## 2019-01-24 DIAGNOSIS — R87.811 ATYPICAL SQUAMOUS CELL CHANGES OF UNDETERMINED SIGNIFICANCE (ASCUS) ON VAGINAL CYTOLOGY WITH POSITIVE HIGH RISK HUMAN PAPILLOMA VIRUS (HPV): Primary | ICD-10-CM

## 2019-01-24 DIAGNOSIS — R87.620 ATYPICAL SQUAMOUS CELL CHANGES OF UNDETERMINED SIGNIFICANCE (ASCUS) ON VAGINAL CYTOLOGY WITH POSITIVE HIGH RISK HUMAN PAPILLOMA VIRUS (HPV): Primary | ICD-10-CM

## 2019-01-24 DIAGNOSIS — R87.811 ATYPICAL SQUAMOUS CELL CHANGES OF UNDETERMINED SIGNIFICANCE (ASCUS) ON VAGINAL CYTOLOGY WITH POSITIVE HIGH RISK HUMAN PAPILLOMA VIRUS (HPV): ICD-10-CM

## 2019-01-24 DIAGNOSIS — R87.620 ATYPICAL SQUAMOUS CELL CHANGES OF UNDETERMINED SIGNIFICANCE (ASCUS) ON VAGINAL CYTOLOGY WITH POSITIVE HIGH RISK HUMAN PAPILLOMA VIRUS (HPV): ICD-10-CM

## 2019-01-24 PROCEDURE — 57454 BX/CURETT OF CERVIX W/SCOPE: CPT | Performed by: OBSTETRICS & GYNECOLOGY

## 2019-01-24 PROCEDURE — 88305 TISSUE EXAM BY PATHOLOGIST: CPT

## 2019-01-28 LAB — SURGICAL PATHOLOGY REPORT: NORMAL

## 2019-01-29 RX ORDER — ESCITALOPRAM OXALATE 10 MG/1
TABLET ORAL
Qty: 90 TABLET | Refills: 3 | Status: SHIPPED | OUTPATIENT
Start: 2019-01-29 | End: 2019-05-31

## 2019-02-11 ENCOUNTER — TELEPHONE (OUTPATIENT)
Dept: OBGYN | Age: 54
End: 2019-02-11

## 2019-02-13 ENCOUNTER — OFFICE VISIT (OUTPATIENT)
Dept: PRIMARY CARE CLINIC | Age: 54
End: 2019-02-13
Payer: COMMERCIAL

## 2019-02-13 VITALS
SYSTOLIC BLOOD PRESSURE: 108 MMHG | HEART RATE: 84 BPM | WEIGHT: 134 LBS | DIASTOLIC BLOOD PRESSURE: 68 MMHG | OXYGEN SATURATION: 99 % | HEIGHT: 67 IN | BODY MASS INDEX: 21.03 KG/M2 | TEMPERATURE: 100 F

## 2019-02-13 DIAGNOSIS — R50.9 FEVER, UNSPECIFIED FEVER CAUSE: Primary | ICD-10-CM

## 2019-02-13 DIAGNOSIS — J10.1 INFLUENZA A: ICD-10-CM

## 2019-02-13 LAB
INFLUENZA A ANTIBODY: ABNORMAL
INFLUENZA B ANTIBODY: NEGATIVE

## 2019-02-13 PROCEDURE — 99213 OFFICE O/P EST LOW 20 MIN: CPT | Performed by: FAMILY MEDICINE

## 2019-02-13 PROCEDURE — 87804 INFLUENZA ASSAY W/OPTIC: CPT | Performed by: FAMILY MEDICINE

## 2019-02-13 ASSESSMENT — ENCOUNTER SYMPTOMS
RHINORRHEA: 1
SORE THROAT: 1
EYES NEGATIVE: 1
ALLERGIC/IMMUNOLOGIC NEGATIVE: 1
COUGH: 1
GASTROINTESTINAL NEGATIVE: 1

## 2019-02-13 ASSESSMENT — PATIENT HEALTH QUESTIONNAIRE - PHQ9
SUM OF ALL RESPONSES TO PHQ QUESTIONS 1-9: 0
SUM OF ALL RESPONSES TO PHQ9 QUESTIONS 1 & 2: 0
2. FEELING DOWN, DEPRESSED OR HOPELESS: 0
1. LITTLE INTEREST OR PLEASURE IN DOING THINGS: 0
SUM OF ALL RESPONSES TO PHQ QUESTIONS 1-9: 0

## 2019-04-02 ENCOUNTER — HOSPITAL ENCOUNTER (OUTPATIENT)
Age: 54
Setting detail: SPECIMEN
Discharge: HOME | End: 2019-04-02
Payer: COMMERCIAL

## 2019-04-02 ENCOUNTER — OFFICE VISIT (OUTPATIENT)
Dept: PRIMARY CARE CLINIC | Age: 54
End: 2019-04-02
Payer: COMMERCIAL

## 2019-04-02 VITALS
TEMPERATURE: 97 F | DIASTOLIC BLOOD PRESSURE: 78 MMHG | HEIGHT: 68 IN | OXYGEN SATURATION: 100 % | BODY MASS INDEX: 21.22 KG/M2 | HEART RATE: 60 BPM | SYSTOLIC BLOOD PRESSURE: 120 MMHG | WEIGHT: 140 LBS

## 2019-04-02 DIAGNOSIS — R30.0 DYSURIA: ICD-10-CM

## 2019-04-02 DIAGNOSIS — N30.01 ACUTE CYSTITIS WITH HEMATURIA: Primary | ICD-10-CM

## 2019-04-02 LAB
-: ABNORMAL
AMORPHOUS: ABNORMAL
BACTERIA: ABNORMAL
BILIRUBIN URINE: NEGATIVE
CASTS UA: ABNORMAL /LPF (ref 0–2)
COLOR: ABNORMAL
COMMENT UA: ABNORMAL
CRYSTALS, UA: ABNORMAL /HPF
EPITHELIAL CELLS UA: ABNORMAL /HPF (ref 0–5)
GLUCOSE URINE: NEGATIVE
KETONES, URINE: NEGATIVE
LEUKOCYTE ESTERASE, URINE: ABNORMAL
MUCUS: ABNORMAL
NITRITE, URINE: NEGATIVE
OTHER OBSERVATIONS UA: ABNORMAL
PH UA: 7 (ref 5–6)
PROTEIN UA: NEGATIVE
RBC UA: ABNORMAL /HPF (ref 0–4)
RENAL EPITHELIAL, UA: ABNORMAL /HPF
SPECIFIC GRAVITY UA: 1.01 (ref 1.01–1.02)
TRICHOMONAS: ABNORMAL
TURBIDITY: ABNORMAL
URINE HGB: ABNORMAL
UROBILINOGEN, URINE: NORMAL
WBC UA: ABNORMAL /HPF (ref 0–4)
YEAST: ABNORMAL

## 2019-04-02 PROCEDURE — 99213 OFFICE O/P EST LOW 20 MIN: CPT | Performed by: PHYSICIAN ASSISTANT

## 2019-04-02 PROCEDURE — 87086 URINE CULTURE/COLONY COUNT: CPT

## 2019-04-02 PROCEDURE — 81001 URINALYSIS AUTO W/SCOPE: CPT

## 2019-04-02 PROCEDURE — 87186 SC STD MICRODIL/AGAR DIL: CPT

## 2019-04-02 PROCEDURE — 87088 URINE BACTERIA CULTURE: CPT

## 2019-04-02 PROCEDURE — 86403 PARTICLE AGGLUT ANTBDY SCRN: CPT

## 2019-04-02 RX ORDER — CIPROFLOXACIN 500 MG/1
500 TABLET, FILM COATED ORAL 2 TIMES DAILY
Qty: 10 TABLET | Refills: 0 | Status: SHIPPED | OUTPATIENT
Start: 2019-04-02 | End: 2019-04-07

## 2019-04-02 ASSESSMENT — PATIENT HEALTH QUESTIONNAIRE - PHQ9
1. LITTLE INTEREST OR PLEASURE IN DOING THINGS: 0
2. FEELING DOWN, DEPRESSED OR HOPELESS: 0
SUM OF ALL RESPONSES TO PHQ QUESTIONS 1-9: 0
SUM OF ALL RESPONSES TO PHQ QUESTIONS 1-9: 0
SUM OF ALL RESPONSES TO PHQ9 QUESTIONS 1 & 2: 0

## 2019-04-02 ASSESSMENT — ENCOUNTER SYMPTOMS: RESPIRATORY NEGATIVE: 1

## 2019-04-02 NOTE — PROGRESS NOTES
Subjective:      Patient ID: Jocelyn Hess is a 52 y.o. female. Dysuria    This is a new problem. The current episode started yesterday. The quality of the pain is described as burning. Associated symptoms include hesitancy and urgency. Pertinent negatives include no flank pain. She has tried NSAIDs and increased fluids for the symptoms. Review of Systems   HENT: Negative. Respiratory: Negative. Cardiovascular: Negative. Genitourinary: Positive for dysuria, hesitancy and urgency. Negative for flank pain. Objective:   Physical Exam   Constitutional: She is oriented to person, place, and time. She appears well-developed. HENT:   Head: Normocephalic. Cardiovascular: Normal rate and regular rhythm. Pulmonary/Chest: Effort normal and breath sounds normal.   Abdominal: Soft. Bowel sounds are normal. There is tenderness (suprapubic). There is no CVA tenderness. Neurological: She is alert and oriented to person, place, and time. Skin: Skin is warm and dry. Psychiatric: She has a normal mood and affect.      Hospital Outpatient Visit on 04/02/2019   Component Date Value Ref Range Status    Color, UA 04/02/2019 NOT REPORTED  YELLOW Final    Turbidity UA 04/02/2019 NOT REPORTED  CLEAR Final    Glucose, Ur 04/02/2019 NEGATIVE  NEGATIVE Final    Bilirubin Urine 04/02/2019 NEGATIVE  NEGATIVE Final    Ketones, Urine 04/02/2019 NEGATIVE  NEGATIVE Final    Specific Gravity, UA 04/02/2019 1.010  1.010 - 1.025 Final    Urine Hgb 04/02/2019 TRACE* NEGATIVE Final    pH, UA 04/02/2019 7.0* 5.0 - 6.0 Final    Protein, UA 04/02/2019 NEGATIVE  NEGATIVE Final    Urobilinogen, Urine 04/02/2019 Normal  Normal Final    Nitrite, Urine 04/02/2019 NEGATIVE  NEGATIVE Final    Leukocyte Esterase, Urine 04/02/2019 1+* NEGATIVE Final    Urinalysis Comments 04/02/2019 NOT REPORTED   Final    - 04/02/2019        Final    WBC, UA 04/02/2019 5 TO 10  0 - 4 /HPF Final    RBC, UA 04/02/2019 None  0 - 4 /HPF Final    Casts UA 04/02/2019 NOT REPORTED  0 - 2 /LPF Final    Crystals UA 04/02/2019 NOT REPORTED  None /HPF Final    Epithelial Cells UA 04/02/2019 0 TO 4  0 - 5 /HPF Final    Renal Epithelial, Urine 04/02/2019 NOT REPORTED  0 /HPF Final    Bacteria, UA 04/02/2019 None  None Final    Mucus, UA 04/02/2019 NOT REPORTED  None Final    Trichomonas, UA 04/02/2019 NOT REPORTED  None Final    Amorphous, UA 04/02/2019 NOT REPORTED  None Final    Other Observations UA 04/02/2019 Specimen Cultured* NOT REQ. Final    Yeast, UA 04/02/2019 NOT REPORTED  None Final     Assessment:      1. Acute cystitis with hematuria    2. Dysuria          Plan:      Culture urine. Cipro 500 mg bid x 5 days. Push fluids. Supportive care advised. Follow-up PRN/as planned with PCP.         FREDDIE Patel

## 2019-04-03 ENCOUNTER — HOSPITAL ENCOUNTER (OUTPATIENT)
Age: 54
Setting detail: SPECIMEN
Discharge: HOME | End: 2019-04-03
Payer: COMMERCIAL

## 2019-04-03 ENCOUNTER — OFFICE VISIT (OUTPATIENT)
Dept: OBGYN | Age: 54
End: 2019-04-03
Payer: COMMERCIAL

## 2019-04-03 VITALS
SYSTOLIC BLOOD PRESSURE: 118 MMHG | HEART RATE: 78 BPM | BODY MASS INDEX: 20.46 KG/M2 | RESPIRATION RATE: 20 BRPM | HEIGHT: 68 IN | DIASTOLIC BLOOD PRESSURE: 82 MMHG | WEIGHT: 135 LBS

## 2019-04-03 DIAGNOSIS — R87.620 ATYPICAL SQUAMOUS CELL CHANGES OF UNDETERMINED SIGNIFICANCE (ASCUS) ON VAGINAL CYTOLOGY WITH POSITIVE HIGH RISK HUMAN PAPILLOMA VIRUS (HPV): ICD-10-CM

## 2019-04-03 DIAGNOSIS — Z78.0 MENOPAUSE: ICD-10-CM

## 2019-04-03 DIAGNOSIS — R87.811 ATYPICAL SQUAMOUS CELL CHANGES OF UNDETERMINED SIGNIFICANCE (ASCUS) ON VAGINAL CYTOLOGY WITH POSITIVE HIGH RISK HUMAN PAPILLOMA VIRUS (HPV): ICD-10-CM

## 2019-04-03 DIAGNOSIS — R10.2 PELVIC PAIN: ICD-10-CM

## 2019-04-03 DIAGNOSIS — R87.811 ATYPICAL SQUAMOUS CELL CHANGES OF UNDETERMINED SIGNIFICANCE (ASCUS) ON VAGINAL CYTOLOGY WITH POSITIVE HIGH RISK HUMAN PAPILLOMA VIRUS (HPV): Primary | ICD-10-CM

## 2019-04-03 DIAGNOSIS — R87.620 ATYPICAL SQUAMOUS CELL CHANGES OF UNDETERMINED SIGNIFICANCE (ASCUS) ON VAGINAL CYTOLOGY WITH POSITIVE HIGH RISK HUMAN PAPILLOMA VIRUS (HPV): Primary | ICD-10-CM

## 2019-04-03 PROCEDURE — 88175 CYTOPATH C/V AUTO FLUID REDO: CPT

## 2019-04-03 PROCEDURE — 99213 OFFICE O/P EST LOW 20 MIN: CPT | Performed by: OBSTETRICS & GYNECOLOGY

## 2019-04-03 NOTE — PROGRESS NOTES
TAKE 1 TABLET DAILY, Disp: 90 tablet, Rfl: 3    estradiol (ESTRACE VAGINAL) 0.1 MG/GM vaginal cream, Apply one gm twice weekly per vagina at bedtime, Disp: 1 Tube, Rfl: 3    vitamin D (CHOLECALCIFEROL) 1000 UNIT TABS tablet, Take 1,000 Units by mouth daily, Disp: , Rfl:     folic acid (FOLVITE) 377 MCG tablet, Take 800 mcg by mouth daily, Disp: , Rfl:     escitalopram (LEXAPRO) 10 MG tablet, TAKE 1 TABLET DAILY, Disp: 90 tablet, Rfl: 3      Review of Systems   All other systems reviewed and are negative. Breast ROS: negative    Objective:   /82 (Site: Right Upper Arm, Position: Sitting, Cuff Size: Medium Adult)   Pulse 78   Resp 20   Ht 5' 7.52\" (1.715 m)   Wt 135 lb (61.2 kg)   LMP  (LMP Unknown)   BMI 20.82 kg/m²     Physical Exam   Constitutional: She is oriented to person, place, and time. She appears well-developed and well-nourished. No distress. Eyes: Pupils are equal, round, and reactive to light. Neck: Normal range of motion. Neck supple. Pulmonary/Chest: Effort normal. No respiratory distress. She has no wheezes. Abdominal: Soft. She exhibits no distension. There is no tenderness. There is no guarding. Genitourinary: Vagina normal. No vaginal discharge found. Genitourinary Comments: Area at 15 O'clock is reddish and bleeds on touch and painful. No masses felt    Musculoskeletal: Normal range of motion. She exhibits no edema or deformity. Neurological: She is alert and oriented to person, place, and time. Skin: Skin is warm. Psychiatric: She has a normal mood and affect. Her behavior is normal.   Nursing note and vitals reviewed. Breast exam: WNL, No skin retraction, dimpling or skin discoloration. No nippledischarge. Any bleeding or pain withintercourse: No    Do you do self breast exams: Yes    Assessment:      Diagnosis Orders   1.  Atypical squamous cell changes of undetermined significance (ASCUS) on vaginal cytology with positive high risk human papilloma virus (HPV)     2. Menopause     3. Pelvic pain             Plan:   No orders of the defined types were placed in this encounter. She had a area at 12:00 and vaginal cough that is reddish and very tender and bleeds on touch at the vaginal vault we will treat with Fem pH after the next 2 weeks and get pelvic ultrasound.   Currently on antibiotics for UTI          Margy Levine MD

## 2019-04-04 LAB
CULTURE: ABNORMAL
CULTURE: ABNORMAL
Lab: ABNORMAL
SPECIMEN DESCRIPTION: ABNORMAL

## 2019-04-05 ENCOUNTER — TELEPHONE (OUTPATIENT)
Dept: OBGYN | Age: 54
End: 2019-04-05

## 2019-04-05 ENCOUNTER — TELEPHONE (OUTPATIENT)
Dept: FAMILY MEDICINE CLINIC | Age: 54
End: 2019-04-05

## 2019-04-05 DIAGNOSIS — N30.00 ACUTE CYSTITIS WITHOUT HEMATURIA: Primary | ICD-10-CM

## 2019-04-05 RX ORDER — CEFUROXIME AXETIL 500 MG/1
500 TABLET ORAL 2 TIMES DAILY
Qty: 10 TABLET | Refills: 0 | OUTPATIENT
Start: 2019-04-05 | End: 2019-04-10

## 2019-04-05 NOTE — TELEPHONE ENCOUNTER
We found her a prescription discount card and she got it from our pharmacy for approx $50 which she was ok with

## 2019-04-05 NOTE — TELEPHONE ENCOUNTER
----- Message from Venkat Gonzalez, [id number] sent at 4/5/2019  7:59 AM EDT -----  E.coli sensitive to Cipro.   Add Ceftin 500 mg bid x 5 days to treat GBS

## 2019-04-05 NOTE — TELEPHONE ENCOUNTER
Patient aware and verbalizes understanding. You mentioned repeat U?.Last coby treated with macrodantin was strep.

## 2019-04-05 NOTE — TELEPHONE ENCOUNTER
Cream prescribed was $92. Pt can not afford, can you suggest another.  Pt was going to try our pharmacy

## 2019-04-15 LAB — CYTOLOGY REPORT: NORMAL

## 2019-04-17 ENCOUNTER — HOSPITAL ENCOUNTER (OUTPATIENT)
Dept: ULTRASOUND IMAGING | Age: 54
Discharge: HOME | End: 2019-04-19
Payer: COMMERCIAL

## 2019-04-17 DIAGNOSIS — R10.2 PELVIC PAIN: ICD-10-CM

## 2019-04-17 PROCEDURE — 76856 US EXAM PELVIC COMPLETE: CPT

## 2019-04-17 PROCEDURE — 76830 TRANSVAGINAL US NON-OB: CPT

## 2019-05-31 ENCOUNTER — OFFICE VISIT (OUTPATIENT)
Dept: PRIMARY CARE CLINIC | Age: 54
End: 2019-05-31
Payer: COMMERCIAL

## 2019-05-31 VITALS
TEMPERATURE: 97.8 F | SYSTOLIC BLOOD PRESSURE: 126 MMHG | HEART RATE: 72 BPM | HEIGHT: 68 IN | OXYGEN SATURATION: 99 % | WEIGHT: 132 LBS | BODY MASS INDEX: 20 KG/M2 | DIASTOLIC BLOOD PRESSURE: 72 MMHG

## 2019-05-31 DIAGNOSIS — M54.9 MUSCULOSKELETAL BACK PAIN: Primary | ICD-10-CM

## 2019-05-31 PROCEDURE — 99213 OFFICE O/P EST LOW 20 MIN: CPT | Performed by: NURSE PRACTITIONER

## 2019-05-31 RX ORDER — CYCLOBENZAPRINE HCL 5 MG
5 TABLET ORAL 2 TIMES DAILY PRN
Qty: 10 TABLET | Refills: 0 | Status: SHIPPED | OUTPATIENT
Start: 2019-05-31 | End: 2019-06-10

## 2019-05-31 RX ORDER — IBUPROFEN 800 MG/1
800 TABLET ORAL DAILY PRN
COMMUNITY
End: 2020-08-14 | Stop reason: SDUPTHER

## 2019-05-31 RX ORDER — METHYLPREDNISOLONE 4 MG/1
TABLET ORAL
Qty: 1 KIT | Refills: 0 | Status: SHIPPED | OUTPATIENT
Start: 2019-05-31 | End: 2019-06-06

## 2019-05-31 ASSESSMENT — ENCOUNTER SYMPTOMS: BACK PAIN: 1

## 2019-06-01 NOTE — PROGRESS NOTES
Daily [place]  5/31/19    Chief Complaint   Patient presents with    Hip Pain     lifting weights on 5/29 which is when this started, worse on Thursday       HPI: Patient presents with intermittent back pain, lifted at the gym on Wednesday and feels like it triggered her pain. She has also done some recent gardening. She has tried the chiropractor, ibuprofen, heat and ice. Past MedHx:     Past Medical History:   Diagnosis Date    Atypical squamous cell changes of undetermined significance (ASCUS) on vaginal cytology with positive high risk human papilloma virus (HPV) 1/22/2019    Back pain, chronic 1981    Basal cell carcinoma     right shoulder    Depression     IBS (irritable bowel syndrome)     Low grade squamous intraepithelial lesion     converting to HGSIL 5/15.  Migraine     PTSD (post-traumatic stress disorder)     Rectocele     Varicose vein         Past Surgical History:   Procedure Laterality Date    CHOLECYSTECTOMY, LAPAROSCOPIC  2006    COLONOSCOPY  06/22/2012    COLONOSCOPY  8/25/2014    internal hemorrhoids Dr Hilary Burkitt pch   [place]  01/29/2010    ENDOMETRIAL ABLATION      HYSTERECTOMY, TOTAL ABDOMINAL  08/20/2015    Due to pap with HGSIL    LEEP      OTHER SURGICAL HISTORY      Schlerotherapy    POSTERIOR CRUCIATE LIGAMENT REPAIR      RECTOCELE REPAIR  2013    UPPER GASTROINTESTINAL ENDOSCOPY  02/15/2006       Social Hx:     Social History     Tobacco Use    Smoking status: Never Smoker    Smokeless tobacco: Never Used   Substance Use Topics    Alcohol use:  Yes     Alcohol/week: 0.0 oz     Comment: SOCIALLY    Drug use: No       No results found for: LABA1C  No results found for: EAG  Lab Results   Component Value Date    WBC 3.1 (L) 10/21/2017    HGB 14.0 10/21/2017    HCT 41.5 10/21/2017    MCV 91.7 10/21/2017     10/21/2017     Lab Results   Component Value Date     (H) 10/21/2017    K 4.5 10/21/2017     10/21/2017 CO2 26 10/21/2017    BUN 15 10/21/2017    CREATININE 0.94 (H) 10/21/2017    GLUCOSE 89 10/21/2017    CALCIUM 9.8 10/21/2017    PROT 7.4 10/21/2017    LABALBU 4.7 10/21/2017    BILITOT 0.47 10/21/2017    ALKPHOS 91 10/21/2017    AST 23 10/21/2017    ALT 20 10/21/2017    LABGLOM >60 10/21/2017    GFRAA >60 10/21/2017       Outpatient Encounter Medications as of 5/31/2019   Medication Sig Dispense Refill    ibuprofen (ADVIL;MOTRIN) 800 MG tablet Take 800 mg by mouth daily as needed for Pain      methylPREDNISolone (MEDROL DOSEPACK) 4 MG tablet Take by mouth. 1 kit 0    cyclobenzaprine (FLEXERIL) 5 MG tablet Take 1 tablet by mouth 2 times daily as needed for Muscle spasms 10 tablet 0    estradiol (ESTRACE VAGINAL) 0.1 MG/GM vaginal cream Apply one gm twice weekly per vagina at bedtime 1 Tube 3    vitamin D (CHOLECALCIFEROL) 1000 UNIT TABS tablet Take 1,000 Units by mouth daily      folic acid (FOLVITE) 069 MCG tablet Take 800 mcg by mouth daily      escitalopram (LEXAPRO) 10 MG tablet TAKE 1 TABLET DAILY 90 tablet 3    [DISCONTINUED] escitalopram (LEXAPRO) 10 MG tablet TAKE 1 TABLET DAILY 90 tablet 3     No facility-administered encounter medications on file as of 5/31/2019. Review of Systems   Constitutional: Negative. HENT: Negative. Genitourinary: Negative. Musculoskeletal: Positive for back pain. Neurological: Negative. Physical Exam   Constitutional: She appears well-developed and well-nourished. Cardiovascular: Normal rate and regular rhythm. Pulmonary/Chest: Effort normal and breath sounds normal.   Musculoskeletal:        Arms:  Right low back pain, does radiate towards right anterior hip. Data reviewed:      :   Diagnosis Orders   1. Musculoskeletal back pain         PLAN:  No follow-ups on file. Orders Placed This Encounter   Medications    methylPREDNISolone (MEDROL DOSEPACK) 4 MG tablet     Sig: Take by mouth.      Dispense:  1 kit     Refill:  0    cyclobenzaprine (FLEXERIL) 5 MG tablet     Sig: Take 1 tablet by mouth 2 times daily as needed for Muscle spasms     Dispense:  10 tablet     Refill:  0     Patient given educational materials - see patient instructions. Discussed use, benefit, and side effects of prescribed medications. All patient questions answered. Pt voiced understanding. Patient agreed with treatment plan. Follow up as directed.        Electronically signed by BO Sweet CNP on 5/31/19 at 8:28 PM

## 2019-07-05 ENCOUNTER — HOSPITAL ENCOUNTER (OUTPATIENT)
Dept: GENERAL RADIOLOGY | Age: 54
Discharge: HOME | End: 2019-07-07
Payer: COMMERCIAL

## 2019-07-05 ENCOUNTER — OFFICE VISIT (OUTPATIENT)
Dept: PRIMARY CARE CLINIC | Age: 54
End: 2019-07-05
Payer: COMMERCIAL

## 2019-07-05 VITALS
HEART RATE: 76 BPM | BODY MASS INDEX: 20.4 KG/M2 | OXYGEN SATURATION: 98 % | WEIGHT: 130 LBS | HEIGHT: 67 IN | DIASTOLIC BLOOD PRESSURE: 62 MMHG | SYSTOLIC BLOOD PRESSURE: 100 MMHG | RESPIRATION RATE: 14 BRPM

## 2019-07-05 DIAGNOSIS — M54.50 CHRONIC MIDLINE LOW BACK PAIN WITHOUT SCIATICA: Primary | ICD-10-CM

## 2019-07-05 DIAGNOSIS — M46.1 SACROILIITIS (HCC): ICD-10-CM

## 2019-07-05 DIAGNOSIS — M54.50 CHRONIC MIDLINE LOW BACK PAIN WITHOUT SCIATICA: ICD-10-CM

## 2019-07-05 DIAGNOSIS — G89.29 CHRONIC MIDLINE LOW BACK PAIN WITHOUT SCIATICA: Primary | ICD-10-CM

## 2019-07-05 DIAGNOSIS — G89.29 CHRONIC MIDLINE LOW BACK PAIN WITHOUT SCIATICA: ICD-10-CM

## 2019-07-05 PROCEDURE — 72100 X-RAY EXAM L-S SPINE 2/3 VWS: CPT

## 2019-07-05 PROCEDURE — 99213 OFFICE O/P EST LOW 20 MIN: CPT | Performed by: PHYSICIAN ASSISTANT

## 2019-07-05 RX ORDER — PREDNISONE 20 MG/1
TABLET ORAL
Qty: 15 TABLET | Refills: 0 | Status: SHIPPED | OUTPATIENT
Start: 2019-07-05 | End: 2020-08-14

## 2019-07-05 RX ORDER — CYCLOBENZAPRINE HCL 10 MG
10 TABLET ORAL NIGHTLY PRN
Qty: 30 TABLET | Refills: 0 | Status: SHIPPED | OUTPATIENT
Start: 2019-07-05 | End: 2019-07-15

## 2019-07-05 ASSESSMENT — PATIENT HEALTH QUESTIONNAIRE - PHQ9
SUM OF ALL RESPONSES TO PHQ QUESTIONS 1-9: 0
SUM OF ALL RESPONSES TO PHQ QUESTIONS 1-9: 0
2. FEELING DOWN, DEPRESSED OR HOPELESS: 0
SUM OF ALL RESPONSES TO PHQ9 QUESTIONS 1 & 2: 0
1. LITTLE INTEREST OR PLEASURE IN DOING THINGS: 0

## 2019-07-05 ASSESSMENT — ENCOUNTER SYMPTOMS
NAUSEA: 0
GASTROINTESTINAL NEGATIVE: 1
BACK PAIN: 1

## 2019-07-05 NOTE — PROGRESS NOTES
person, place, and time. Nl gait. Nl strength and sensation lower extremities bilaterally. No radicular pain with straight leg or knee raise. Skin: Skin is warm and dry. No erythema. Psychiatric: She has a normal mood and affect. Her behavior is normal.   Nursing note and vitals reviewed. Xr Lumbar Spine (2-3 Views)    Result Date: 7/5/2019  EXAMINATION: 3 XRAY VIEWS OF THE LUMBAR SPINE 7/5/2019 10:50 am COMPARISON: 11/24/2009. HISTORY: ORDERING SYSTEM PROVIDED HISTORY: Chronic midline low back pain without sciatica TECHNOLOGIST PROVIDED HISTORY: LS back pain right sacroiliitis Reason for Exam: pain x 2 months, midline, right sided, no injury Acuity: Chronic Type of Exam: Initial FINDINGS: Lumbar vertebral alignment is normal.  Bones are mildly osteopenic with no acute fracture. Mild to moderate multilevel degenerative disc disease with disc space narrowing and spondylosis extending from L2-3 through L5-S1. Mild progression at L2-3 and L4-5 since the comparison study. Mild lower lumbar facet arthropathy. The SI joints are maintained. Post cholecystectomy clips. No acute osseous abnormality of the lumbar spine. Osteopenia with mild to moderate multilevel degenerative disc disease. Assessment & Plan:      Diagnosis Orders   1. Chronic midline low back pain without sciatica  XR LUMBAR SPINE (2-3 VIEWS)    predniSONE (DELTASONE) 20 MG tablet    cyclobenzaprine (FLEXERIL) 10 MG tablet    [facility] [place]   2.  Sacroiliitis (HCC)  XR LUMBAR SPINE (2-3 VIEWS)    predniSONE (DELTASONE) 20 MG tablet    cyclobenzaprine (FLEXERIL) 10 MG tablet    [facility] [place]     Heat to the back biofreeze  Answered her questions  HEP given to patient with return demonstration  Follow up 2 weeks sooner if problems        FREDDIE Quiroz  7/5/2019 10:12 AM    (Pleasenote that portions of this note were completed with a voice recognition program.Efforts were made to edit the dictations but occasionally words are mis-transcribed.)

## 2019-07-10 ENCOUNTER — HOSPITAL ENCOUNTER (OUTPATIENT)
Dept: PHYSICAL THERAPY | Age: 54
Setting detail: THERAPIES SERIES
Discharge: HOME | End: 2019-07-10
Payer: COMMERCIAL

## 2019-07-10 PROCEDURE — 97162 PT EVAL MOD COMPLEX 30 MIN: CPT

## 2019-07-10 PROCEDURE — 97110 THERAPEUTIC EXERCISES: CPT

## 2019-07-10 ASSESSMENT — PAIN DESCRIPTION - PAIN TYPE: TYPE: CHRONIC PAIN

## 2019-07-10 ASSESSMENT — PAIN DESCRIPTION - PROGRESSION: CLINICAL_PROGRESSION: GRADUALLY IMPROVING

## 2019-07-10 ASSESSMENT — PAIN DESCRIPTION - DESCRIPTORS: DESCRIPTORS: ACHING

## 2019-07-10 ASSESSMENT — PAIN DESCRIPTION - LOCATION: LOCATION: BACK

## 2019-07-10 ASSESSMENT — PAIN DESCRIPTION - FREQUENCY: FREQUENCY: CONTINUOUS

## 2019-07-10 ASSESSMENT — PAIN - FUNCTIONAL ASSESSMENT: PAIN_FUNCTIONAL_ASSESSMENT: PREVENTS OR INTERFERES WITH MANY ACTIVE NOT PASSIVE ACTIVITIES

## 2019-07-10 ASSESSMENT — PAIN DESCRIPTION - ORIENTATION: ORIENTATION: RIGHT

## 2019-07-10 ASSESSMENT — PAIN DESCRIPTION - ONSET: ONSET: ON-GOING

## 2019-07-10 ASSESSMENT — PAIN SCALES - GENERAL: PAINLEVEL_OUTOF10: 4

## 2019-07-10 NOTE — PROGRESS NOTES
Independent  Homemaking Responsibilities: Yes  Meal Prep Responsibility: Primary  Laundry Responsibility: Primary  Cleaning Responsibility: Primary  Shopping Responsibility: Primary  Ambulation Assistance: Independent  Transfer Assistance: Independent  Active : Yes  Mode of Transportation: [facility]  Occupation: Full time employment  Type of occupation: [employer]or  Leisure & Hobbies: Exercising    Objective     Observation/Palpation  Posture: Fair    Spine  Lumbar: Flex: WNL, Ext: 32 deg, R SB; 19 deg, L SB: 17 deg          Additional Measures  Special Tests: + repeated flexion,- repeated extension, +DKTC, R LE long and L ASIS short    Assessment   Conditions Requiring Skilled Therapeutic Intervention  Body structures, Functions, Activity limitations: Decreased ADL status; Increased Pain  Prognosis: Good  Decision Making: Medium Complexity  Activity Tolerance  Activity Tolerance: Patient Tolerated treatment well         Plan   Plan  Times per week: 2  Plan weeks: 6  Current Treatment Recommendations: Strengthening, ADL/Self-care Training, Gait Training, Patient/Caregiver Education & Training, Stair training, Pain Management, Modalities, Functional Mobility Training, Endurance Training, Manual Therapy - Soft Tissue Mobilization, Home Exercise Program, Safety Education & Training    Goals  Short term goals  Time Frame for Short term goals: 3 weeks  Short term goal 1: Initate HEP  Short term goal 2: Pt to decrease pain to <2/10 pain to assist with ADLs  Long term goals  Time Frame for Long term goals : 6 weeks  Long term goal 1: Independent HEP  Long term goal 2: Pt to decrease pain to 0/10 pain to assist with ADLs  Long term goal 3: Pt to score 5/10 on the Oswestry Low Back Pain scale to return to prior level of function. Long term goal 4: Pt able to lift objects 25 lbs from the floor without an increase in pain to assist with ADLs.   Patient Goals   Patient goals : \" Loosen muscles\"       Therapy Time   Individual

## 2019-07-11 ENCOUNTER — OFFICE VISIT (OUTPATIENT)
Dept: FAMILY MEDICINE CLINIC | Age: 54
End: 2019-07-11
Payer: COMMERCIAL

## 2019-07-11 VITALS
HEART RATE: 64 BPM | SYSTOLIC BLOOD PRESSURE: 112 MMHG | BODY MASS INDEX: 20.88 KG/M2 | WEIGHT: 133 LBS | DIASTOLIC BLOOD PRESSURE: 64 MMHG | HEIGHT: 67 IN

## 2019-07-11 DIAGNOSIS — K58.0 IRRITABLE BOWEL SYNDROME WITH DIARRHEA: ICD-10-CM

## 2019-07-11 DIAGNOSIS — F33.42 RECURRENT MAJOR DEPRESSIVE DISORDER, IN FULL REMISSION (HCC): Primary | ICD-10-CM

## 2019-07-11 DIAGNOSIS — E55.9 VITAMIN D DEFICIENCY: ICD-10-CM

## 2019-07-11 DIAGNOSIS — F41.9 ANXIETY: ICD-10-CM

## 2019-07-11 DIAGNOSIS — R87.613 HGSIL (HIGH GRADE SQUAMOUS INTRAEPITHELIAL LESION) ON PAP SMEAR OF CERVIX: ICD-10-CM

## 2019-07-11 DIAGNOSIS — I83.93 ASYMPTOMATIC VARICOSE VEINS OF BOTH LOWER EXTREMITIES: ICD-10-CM

## 2019-07-11 PROCEDURE — 99214 OFFICE O/P EST MOD 30 MIN: CPT | Performed by: FAMILY MEDICINE

## 2019-07-11 ASSESSMENT — ENCOUNTER SYMPTOMS
GASTROINTESTINAL NEGATIVE: 1
EYES NEGATIVE: 1
ALLERGIC/IMMUNOLOGIC NEGATIVE: 1
RESPIRATORY NEGATIVE: 1

## 2019-07-11 NOTE — PROGRESS NOTES
better. No radicular symptoms at present. Starting PT. Rec. Resume ibuprofen prn. Vitamin D deficiency. Taking daily supplement .   Improved to low normal last check 7/18

## 2019-07-17 ENCOUNTER — HOSPITAL ENCOUNTER (OUTPATIENT)
Dept: PHYSICAL THERAPY | Age: 54
Setting detail: THERAPIES SERIES
Discharge: HOME | End: 2019-07-17
Payer: COMMERCIAL

## 2019-07-17 PROCEDURE — 97110 THERAPEUTIC EXERCISES: CPT

## 2019-07-18 ENCOUNTER — APPOINTMENT (OUTPATIENT)
Dept: PHYSICAL THERAPY | Age: 54
End: 2019-07-18
Payer: COMMERCIAL

## 2019-07-24 ENCOUNTER — HOSPITAL ENCOUNTER (OUTPATIENT)
Dept: PHYSICAL THERAPY | Age: 54
Setting detail: THERAPIES SERIES
Discharge: HOME | End: 2019-07-24
Payer: COMMERCIAL

## 2019-07-24 PROCEDURE — 97110 THERAPEUTIC EXERCISES: CPT

## 2019-07-24 PROCEDURE — 97140 MANUAL THERAPY 1/> REGIONS: CPT

## 2019-07-30 ENCOUNTER — HOSPITAL ENCOUNTER (OUTPATIENT)
Dept: PHYSICAL THERAPY | Age: 54
Setting detail: THERAPIES SERIES
Discharge: HOME | End: 2019-07-30
Payer: COMMERCIAL

## 2019-07-30 PROCEDURE — 97110 THERAPEUTIC EXERCISES: CPT

## 2019-07-30 NOTE — FLOWSHEET NOTE
Therapeutic activities, direct (one-on-one) patient contact (use of dynamic activities to improve functional performance). (86554)    Gait:   [] Provided training and instruction to the patient for ambulation re-education. (71482)    Self-Care/ADL's  [] Self-care/home management training and compensatory training, meal preparation, safety procedures, and instructions in use of assistive technology devices/adaptive equipment, direct one-on-one contact. (28575)    Home Exercise Program:     [x] Reviewed/Progressed HEP activities related to strengthening, flexibility, endurance, ROM. (17838)  [] Reviewed/Progressed HEP activities related to improving balance, coordination, kinesthetic sense, posture, motor skill, proprioception.  (77041)    Manual Treatments:   [] Provided manual therapy to mobilize soft tissue/joints for the purpose of modulating pain, promoting relaxation,  increasing ROM, reducing/eliminating soft tissue swelling/inflammation/restriction, improving soft tissue extensibility. (80410)    Service Based Modalities:      Timed Code Treatment Minutes:   36' ex     Total Treatment Minutes: 36'     Treatment/Activity Tolerance:  [x] Patient tolerated treatment well [] Patient limited by fatique  [] Patient limited by pain  [] Patient limited by other medical complications  [] Other:     Prognosis: [x] Good [] Fair  [] Poor    Patient Requires Follow-up: [x] Yes  [] No      Goals:  Short term goals  Time Frame for Short term goals: 3 weeks  Short term goal 1: Initate HEP  Short term goal 2: Pt to decrease pain to <2/10 pain to assist with ADLs    Long term goals  Time Frame for Long term goals : 6 weeks  Long term goal 1: Independent HEP  Long term goal 2: Pt to decrease pain to 0/10 pain to assist with ADLs  Long term goal 3: Pt to score 5/10 on the Oswestry Low Back Pain scale to return to prior level of function.   Long term goal 4: Pt able to lift objects 25 lbs from the floor without an increase in pain to assist with ADLs. Plan:   [x] Continue per plan of care [] Alter current plan (see comments)  [] Plan of care initiated [] Hold pending MD visit [] Discharge  Plan for Next Session:  Plan for recheck next session, plan to see in 3 weeks.      Electronically signed by:  Janette Demarco

## 2019-07-31 ENCOUNTER — OFFICE VISIT (OUTPATIENT)
Dept: OBGYN | Age: 54
End: 2019-07-31
Payer: COMMERCIAL

## 2019-07-31 ENCOUNTER — HOSPITAL ENCOUNTER (OUTPATIENT)
Age: 54
Setting detail: SPECIMEN
Discharge: HOME | End: 2019-07-31
Payer: COMMERCIAL

## 2019-07-31 VITALS
SYSTOLIC BLOOD PRESSURE: 112 MMHG | DIASTOLIC BLOOD PRESSURE: 78 MMHG | RESPIRATION RATE: 18 BRPM | HEIGHT: 67 IN | HEART RATE: 78 BPM | BODY MASS INDEX: 21.35 KG/M2 | WEIGHT: 136 LBS

## 2019-07-31 DIAGNOSIS — Z12.4 PAP SMEAR FOR CERVICAL CANCER SCREENING: Primary | ICD-10-CM

## 2019-07-31 DIAGNOSIS — Z12.4 PAP SMEAR FOR CERVICAL CANCER SCREENING: ICD-10-CM

## 2019-07-31 DIAGNOSIS — R87.620 ATYPICAL SQUAMOUS CELL CHANGES OF UNDETERMINED SIGNIFICANCE (ASCUS) ON VAGINAL CYTOLOGY WITH POSITIVE HIGH RISK HUMAN PAPILLOMA VIRUS (HPV): ICD-10-CM

## 2019-07-31 DIAGNOSIS — R87.811 ATYPICAL SQUAMOUS CELL CHANGES OF UNDETERMINED SIGNIFICANCE (ASCUS) ON VAGINAL CYTOLOGY WITH POSITIVE HIGH RISK HUMAN PAPILLOMA VIRUS (HPV): ICD-10-CM

## 2019-07-31 PROCEDURE — 99213 OFFICE O/P EST LOW 20 MIN: CPT | Performed by: OBSTETRICS & GYNECOLOGY

## 2019-07-31 PROCEDURE — 87624 HPV HI-RISK TYP POOLED RSLT: CPT

## 2019-07-31 PROCEDURE — 88175 CYTOPATH C/V AUTO FLUID REDO: CPT

## 2019-08-02 LAB — CYTOLOGY REPORT: NORMAL

## 2019-08-06 ENCOUNTER — APPOINTMENT (OUTPATIENT)
Dept: PHYSICAL THERAPY | Age: 54
End: 2019-08-06
Payer: COMMERCIAL

## 2019-08-07 ENCOUNTER — HOSPITAL ENCOUNTER (OUTPATIENT)
Dept: PHYSICAL THERAPY | Age: 54
Setting detail: THERAPIES SERIES
Discharge: HOME | End: 2019-08-07
Payer: COMMERCIAL

## 2019-08-07 PROCEDURE — 97140 MANUAL THERAPY 1/> REGIONS: CPT

## 2019-08-07 NOTE — FLOWSHEET NOTE
(38048)    Gait:   [] Provided training and instruction to the patient for ambulation re-education. (66310)    Self-Care/ADL's  [] Self-care/home management training and compensatory training, meal preparation, safety procedures, and instructions in use of assistive technology devices/adaptive equipment, direct one-on-one contact. (24835)    Home Exercise Program:     [x] Reviewed/Progressed HEP activities related to strengthening, flexibility, endurance, ROM. (63231)  [] Reviewed/Progressed HEP activities related to improving balance, coordination, kinesthetic sense, posture, motor skill, proprioception.  (97199)    Manual Treatments:  -IDN performed this date by Francisco Javier Houston PT to decrease pain, trigger points, tone, and spasm as well as increase tissue extensibility. Specific placement and dose can be seen on separately scanned image. [x] Provided manual therapy to mobilize soft tissue/joints for the purpose of modulating pain, promoting relaxation,  increasing ROM, reducing/eliminating soft tissue swelling/inflammation/restriction, improving soft tissue extensibility. (38196)    Service Based Modalities:      Timed Code Treatment Minutes:   28' man     Total Treatment Minutes: 28'     Treatment/Activity Tolerance:  [x] Patient tolerated treatment well [] Patient limited by fatique  [] Patient limited by pain  [] Patient limited by other medical complications  [] Other:     Prognosis: [x] Good [] Fair  [] Poor    Patient Requires Follow-up: [x] Yes  [] No      Goals:  Short term goals  Time Frame for Short term goals: 3 weeks  Short term goal 1: Initate HEP  Short term goal 2: Pt to decrease pain to <2/10 pain to assist with ADLs    Long term goals  Time Frame for Long term goals : 6 weeks  Long term goal 1: Independent HEP  Long term goal 2: Pt to decrease pain to 0/10 pain to assist with ADLs  Long term goal 3: Pt to score 5/10 on the Oswestry Low Back Pain scale to return to prior level of function.   Long term

## 2019-08-09 LAB
HPV SAMPLE: ABNORMAL
HPV, GENOTYPE 16: NOT DETECTED
HPV, GENOTYPE 18: NOT DETECTED
HPV, HIGH RISK OTHER: DETECTED
HPV, INTERPRETATION: ABNORMAL
SPECIMEN DESCRIPTION: ABNORMAL

## 2019-08-16 ENCOUNTER — TELEPHONE (OUTPATIENT)
Dept: OBGYN | Age: 54
End: 2019-08-16

## 2019-08-20 ENCOUNTER — HOSPITAL ENCOUNTER (OUTPATIENT)
Dept: PHYSICAL THERAPY | Age: 54
Setting detail: THERAPIES SERIES
Discharge: HOME | End: 2019-08-20
Payer: COMMERCIAL

## 2019-08-22 ENCOUNTER — HOSPITAL ENCOUNTER (OUTPATIENT)
Age: 54
Setting detail: SPECIMEN
Discharge: HOME | End: 2019-08-22
Payer: COMMERCIAL

## 2019-08-22 ENCOUNTER — PROCEDURE VISIT (OUTPATIENT)
Dept: OBGYN | Age: 54
End: 2019-08-22
Payer: COMMERCIAL

## 2019-08-22 VITALS
HEIGHT: 67 IN | RESPIRATION RATE: 20 BRPM | BODY MASS INDEX: 21.19 KG/M2 | SYSTOLIC BLOOD PRESSURE: 118 MMHG | DIASTOLIC BLOOD PRESSURE: 78 MMHG | WEIGHT: 135 LBS | HEART RATE: 88 BPM

## 2019-08-22 DIAGNOSIS — R87.620 ATYPICAL SQUAMOUS CELL CHANGES OF UNDETERMINED SIGNIFICANCE (ASCUS) ON VAGINAL CYTOLOGY WITH POSITIVE HIGH RISK HUMAN PAPILLOMA VIRUS (HPV): ICD-10-CM

## 2019-08-22 DIAGNOSIS — R87.811 ATYPICAL SQUAMOUS CELL CHANGES OF UNDETERMINED SIGNIFICANCE (ASCUS) ON VAGINAL CYTOLOGY WITH POSITIVE HIGH RISK HUMAN PAPILLOMA VIRUS (HPV): Primary | ICD-10-CM

## 2019-08-22 DIAGNOSIS — R87.620 ATYPICAL SQUAMOUS CELL CHANGES OF UNDETERMINED SIGNIFICANCE (ASCUS) ON VAGINAL CYTOLOGY WITH POSITIVE HIGH RISK HUMAN PAPILLOMA VIRUS (HPV): Primary | ICD-10-CM

## 2019-08-22 DIAGNOSIS — R87.811 ATYPICAL SQUAMOUS CELL CHANGES OF UNDETERMINED SIGNIFICANCE (ASCUS) ON VAGINAL CYTOLOGY WITH POSITIVE HIGH RISK HUMAN PAPILLOMA VIRUS (HPV): ICD-10-CM

## 2019-08-22 PROCEDURE — 88305 TISSUE EXAM BY PATHOLOGIST: CPT

## 2019-08-22 PROCEDURE — 57421 EXAM/BIOPSY OF VAG W/SCOPE: CPT | Performed by: OBSTETRICS & GYNECOLOGY

## 2019-08-22 NOTE — PROGRESS NOTES
(ESTRACE VAGINAL) 0.1 MG/GM vaginal cream Apply one gm twice weekly per vagina at bedtime 1 Tube 3    vitamin D (CHOLECALCIFEROL) 1000 UNIT TABS tablet Take 1,000 Units by mouth daily      folic acid (FOLVITE) 190 MCG tablet Take 800 mcg by mouth daily      escitalopram (LEXAPRO) 10 MG tablet TAKE 1 TABLET DAILY 90 tablet 3    predniSONE (DELTASONE) 20 MG tablet One tab bid for 5 days then one tab qd for 5 days (Patient not taking: Reported on 8/22/2019) 15 tablet 0     No current facility-administered medications for this visit. /78 (Site: Left Upper Arm, Position: Sitting, Cuff Size: Medium Adult)   Pulse 88   Resp 20   Ht 5' 7.01\" (1.702 m)   Wt 135 lb (61.2 kg)   LMP  (LMP Unknown)   BMI 21.14 kg/m²     Review of Systems   All other systems reviewed and are negative. Indications: Pap smear 1 months ago showed: ASCUS with POSITIVE high risk HPV. The prior pap showed no abnormalities. Prior cervical/vaginal disease: normal exam without visible pathology. Prior cervical treatment: hysterectomy. NURSE: Danish Meyer    Procedure Details   The risks and benefits of the procedure and Written informed consent obtained. Speculum placed in vagina and suboptimal visualization of Vaginal vault swabbed x 3 with acetic acid solution. Findings:  Cervix: Absent  Vaginal inspection: lesion(s) noted at 12 &9 o'clock and biopsies taken at 12 & 9 o'clock. The patient had a hard time with the speculum being opened so there is a suboptimal opening and there was 2 areas that could be seen to be red and inflamed at 12:00 and at 9. Benign o'clock is very tender and therefore lidocaine spray was used to the area and 2 biopsies were taken. Silver nitrate sticks used to stop the bleeding and to treat the area on the top of the vagina at 12:00 and on the lateral right side at 9:00 so solution applied after that. Vulvar colposcopy: vulvar colposcopy not performed.   Physical Exam   Constitutional: She

## 2019-08-28 LAB — SURGICAL PATHOLOGY REPORT: NORMAL

## 2019-11-19 ENCOUNTER — HOSPITAL ENCOUNTER (OUTPATIENT)
Age: 54
Setting detail: SPECIMEN
Discharge: HOME | End: 2019-11-19
Payer: COMMERCIAL

## 2019-11-19 ENCOUNTER — PROCEDURE VISIT (OUTPATIENT)
Dept: OBGYN | Age: 54
End: 2019-11-19
Payer: COMMERCIAL

## 2019-11-19 VITALS — HEIGHT: 67 IN | RESPIRATION RATE: 20 BRPM | BODY MASS INDEX: 21.5 KG/M2 | WEIGHT: 137 LBS

## 2019-11-19 DIAGNOSIS — R87.622 LOW GRADE SQUAMOUS INTRAEPITH LESION ON CYTOLOGIC SMEAR VAGINA (LGSIL): ICD-10-CM

## 2019-11-19 DIAGNOSIS — R87.622 LOW GRADE SQUAMOUS INTRAEPITH LESION ON CYTOLOGIC SMEAR VAGINA (LGSIL): Primary | ICD-10-CM

## 2019-11-19 PROCEDURE — 88305 TISSUE EXAM BY PATHOLOGIST: CPT

## 2019-11-19 PROCEDURE — 57421 EXAM/BIOPSY OF VAG W/SCOPE: CPT | Performed by: OBSTETRICS & GYNECOLOGY

## 2019-11-19 PROCEDURE — 99213 OFFICE O/P EST LOW 20 MIN: CPT | Performed by: OBSTETRICS & GYNECOLOGY

## 2019-11-21 LAB — SURGICAL PATHOLOGY REPORT: NORMAL

## 2019-11-21 RX ORDER — CLINDAMYCIN PHOSPHATE 20 MG/G
CREAM VAGINAL
Qty: 1 TUBE | Refills: 0 | Status: SHIPPED | OUTPATIENT
Start: 2019-11-21 | End: 2020-08-14 | Stop reason: ALTCHOICE

## 2019-11-26 ENCOUNTER — TELEPHONE (OUTPATIENT)
Dept: OBGYN | Age: 54
End: 2019-11-26

## 2020-01-08 ENCOUNTER — TELEPHONE (OUTPATIENT)
Dept: MAMMOGRAPHY | Age: 55
End: 2020-01-08

## 2020-01-13 RX ORDER — ESCITALOPRAM OXALATE 10 MG/1
TABLET ORAL
Qty: 90 TABLET | Refills: 1 | Status: SHIPPED | OUTPATIENT
Start: 2020-01-13 | End: 2020-07-13

## 2020-01-14 ENCOUNTER — HOSPITAL ENCOUNTER (OUTPATIENT)
Dept: MAMMOGRAPHY | Age: 55
Discharge: HOME | End: 2020-01-16
Payer: COMMERCIAL

## 2020-01-14 PROCEDURE — 77063 BREAST TOMOSYNTHESIS BI: CPT

## 2020-01-15 RX ORDER — ESTRADIOL 0.1 MG/G
CREAM VAGINAL
Qty: 42.5 G | Refills: 3 | Status: SHIPPED | OUTPATIENT
Start: 2020-01-15 | End: 2021-01-13 | Stop reason: SDUPTHER

## 2020-01-28 NOTE — DISCHARGE SUMMARY
[facility]    [x] [place]  Phone: [phone]  Fax: [phone]      [] [place]  Phone: [phone]  Fax: [phone]    Physical Therapy Discharge Note  Date: 2020        Patient Name:  Katty Mcdaniel    :  1965  MRN: [id number]  Restrictions/Precautions:     Medical/Treatment Diagnosis Information:   · Diagnosis: Chronic midline low back pain without sciatica  ·   Insurance/Certification information:  PT Insurance Information: BCBS  Physician Information:  Referring Practitioner: FREDDIE Cullen  Plan of care signed (Y/N): y  Visit# / total visits: 5/10  Pain level:      0/10       Time Period for Report: 7/10/19-19  Cancels/No-shows to date:  1    Plan of Care/Treatment to date:  [x] Therapeutic Exercise    [x] Modalities:  [x] Therapeutic Activity     [] Ultrasound  [] Electrical Stimulation  [x] Gait Training      [] Cervical Traction    [] Lumbar Traction  [x] Neuromuscular Re-education  [] Cold/hotpack [] Iontophoresis  [x] Instruction in HEP      Other:  [x] Manual Therapy       []    [] Aquatic Therapy       []                    ? Subjective:  ·   Patient noting no pain this date, states nearly 100%. States has not went back to exercising yet. Would like to hold for 3 weeks until after vacation to see if able to maintain on her own. · Objective: CAMPBELL per flowsheet to decrease pain and inflammation. Patient had Right LE short this date. Patient noting noting moderate tightness and spasm on the Right lumbar paraspinals and sacrum. Patient had pop upon return to supine, equal leg length and ASIS. IDN  / Estim this date secondary to pain. Assessment:     Patient last seen on 19, cancelled last session and no return.    Plan:       DC PT Services     Goals:    Goals:  Short term goals  Time Frame for Short term goals: 3 weeks  Short term goal 1: Initate HEP  Short term goal 2: Pt to decrease pain to

## 2020-03-10 ENCOUNTER — PROCEDURE VISIT (OUTPATIENT)
Dept: OBGYN | Age: 55
End: 2020-03-10
Payer: COMMERCIAL

## 2020-03-10 ENCOUNTER — HOSPITAL ENCOUNTER (OUTPATIENT)
Age: 55
Setting detail: SPECIMEN
Discharge: HOME | End: 2020-03-10
Payer: COMMERCIAL

## 2020-03-10 VITALS
HEIGHT: 67 IN | RESPIRATION RATE: 20 BRPM | SYSTOLIC BLOOD PRESSURE: 130 MMHG | HEART RATE: 84 BPM | DIASTOLIC BLOOD PRESSURE: 80 MMHG | WEIGHT: 138 LBS | BODY MASS INDEX: 21.66 KG/M2

## 2020-03-10 PROCEDURE — 57421 EXAM/BIOPSY OF VAG W/SCOPE: CPT | Performed by: OBSTETRICS & GYNECOLOGY

## 2020-03-10 PROCEDURE — 88305 TISSUE EXAM BY PATHOLOGIST: CPT

## 2020-03-10 NOTE — PROGRESS NOTES
Colposcopy Procedure Note  Colposcope is used for this procedure. Date of service: 3/10/2020    Margie Kelsey  Is a 62 y.o.  female    PT's PCP is: Vicenta Novoa MD     : 1965   No chief complaint on file. Past Medical History:   Diagnosis Date    Atypical squamous cell changes of undetermined significance (ASCUS) on vaginal cytology with positive high risk human papilloma virus (HPV) 2019    Back pain, chronic 1981    Basal cell carcinoma     right shoulder    Depression     IBS (irritable bowel syndrome)     Low grade squamous intraepithelial lesion     converting to HGSIL 5/15.  Migraine     PTSD (post-traumatic stress disorder)     Rectocele     Varicose vein      Past Surgical History:   Procedure Laterality Date    CHOLECYSTECTOMY, LAPAROSCOPIC      COLONOSCOPY  2012    COLONOSCOPY  2014    internal hemorrhoids Dr Meng [facility]    Dameon Hines  2010    ENDOMETRIAL ABLATION      HYSTERECTOMY, TOTAL ABDOMINAL  2015    Due to pap with HGSIL    LEEP      OTHER SURGICAL HISTORY      Schlerotherapy    POSTERIOR CRUCIATE LIGAMENT REPAIR      RECTOCELE REPAIR      UPPER GASTROINTESTINAL ENDOSCOPY  02/15/2006     Family History   Problem Relation Age of Onset    Glaucoma Neg Hx     Diabetes Neg Hx     Cataracts Neg Hx       reports that she has never smoked. She has never used smokeless tobacco. She reports current alcohol use. She reports that she does not use drugs.   Allergies   Allergen Reactions    Penicillins Rash     In past known as \"polycillin\"    Polycillin-N  [Ampicillin] Rash     Current Outpatient Medications   Medication Sig Dispense Refill    estradiol (ESTRACE) 0.1 MG/GM vaginal cream INSERT 1/4 APPLICATORFUL (1GRAM) VAGINALLY 2 TIMES A  WEEK AT BEDTIME 42.5 g 3    escitalopram (LEXAPRO) 10 MG tablet TAKE 1 TABLET DAILY 90 tablet 1    clindamycin (CLEOCIN) 2 % vaginal cream Place vaginally twice weekly tenderness. There is no guarding. Genitourinary:     General: Normal vulva. Vagina: No vaginal discharge. Musculoskeletal: Normal range of motion. General: No deformity. Skin:     General: Skin is warm. Neurological:      General: No focal deficit present. Mental Status: She is alert and oriented to person, place, and time. Psychiatric:         Mood and Affect: Mood normal.         Behavior: Behavior normal.           Specimens: 1    Complications: none. Plan:     Diagnosis Orders   1. Low grade squamous intraepith lesion on cytologic smear vagina (lgsil)  Surgical Pathology   2. Atypical squamous cell changes of undetermined significance (ASCUS) on vaginal cytology with positive high risk human papilloma virus (HPV)  Surgical Pathology             Specimens labelled and sent to Pathology. Will base further treatment on Pathology findings. Post biopsy instructions given to patient. No follow-ups on file.     TON OLIVA,8/52/9422 4:20 PM

## 2020-03-12 LAB — SURGICAL PATHOLOGY REPORT: NORMAL

## 2020-06-18 ENCOUNTER — HOSPITAL ENCOUNTER (OUTPATIENT)
Age: 55
Setting detail: SPECIMEN
Discharge: HOME | End: 2020-06-18
Payer: COMMERCIAL

## 2020-06-18 ENCOUNTER — OFFICE VISIT (OUTPATIENT)
Dept: OBGYN | Age: 55
End: 2020-06-18
Payer: COMMERCIAL

## 2020-06-18 VITALS
DIASTOLIC BLOOD PRESSURE: 70 MMHG | RESPIRATION RATE: 20 BRPM | TEMPERATURE: 97.5 F | HEIGHT: 67 IN | BODY MASS INDEX: 21.03 KG/M2 | HEART RATE: 78 BPM | SYSTOLIC BLOOD PRESSURE: 124 MMHG | WEIGHT: 134 LBS

## 2020-06-18 PROCEDURE — G0145 SCR C/V CYTO,THINLAYER,RESCR: HCPCS

## 2020-06-18 PROCEDURE — 99214 OFFICE O/P EST MOD 30 MIN: CPT | Performed by: OBSTETRICS & GYNECOLOGY

## 2020-06-25 LAB — CYTOLOGY REPORT: NORMAL

## 2020-07-13 RX ORDER — ESCITALOPRAM OXALATE 10 MG/1
TABLET ORAL
Qty: 90 TABLET | Refills: 1 | Status: SHIPPED | OUTPATIENT
Start: 2020-07-13 | End: 2020-12-28

## 2020-08-10 ENCOUNTER — HOSPITAL ENCOUNTER (OUTPATIENT)
Dept: LAB | Age: 55
Discharge: HOME | End: 2020-08-10
Payer: COMMERCIAL

## 2020-08-10 LAB
ABSOLUTE EOS #: 0.07 K/UL (ref 0–0.44)
ABSOLUTE IMMATURE GRANULOCYTE: <0.03 K/UL (ref 0–0.3)
ABSOLUTE LYMPH #: 1.75 K/UL (ref 1.1–3.7)
ABSOLUTE MONO #: 0.33 K/UL (ref 0.1–1.2)
BASOPHILS # BLD: 1 % (ref 0–2)
BASOPHILS ABSOLUTE: <0.03 K/UL (ref 0–0.2)
DIFFERENTIAL TYPE: ABNORMAL
EOSINOPHILS RELATIVE PERCENT: 2 % (ref 1–4)
HCT VFR BLD CALC: 40.7 % (ref 36.3–47.1)
HEMOGLOBIN: 13.6 G/DL (ref 11.9–15.1)
IMMATURE GRANULOCYTES: 0 %
LYMPHOCYTES # BLD: 44 % (ref 24–43)
MCH RBC QN AUTO: 30.4 PG (ref 25.2–33.5)
MCHC RBC AUTO-ENTMCNC: 33.4 G/DL (ref 25.2–33.5)
MCV RBC AUTO: 90.8 FL (ref 82.6–102.9)
MONOCYTES # BLD: 8 % (ref 3–12)
NRBC AUTOMATED: 0 PER 100 WBC
PDW BLD-RTO: 12.2 % (ref 11.8–14.4)
PLATELET # BLD: 204 K/UL (ref 138–453)
PLATELET ESTIMATE: ABNORMAL
PMV BLD AUTO: 10.4 FL (ref 8.1–13.5)
RBC # BLD: 4.48 M/UL (ref 3.95–5.11)
RBC # BLD: ABNORMAL 10*6/UL
SEG NEUTROPHILS: 45 % (ref 36–65)
SEGMENTED NEUTROPHILS ABSOLUTE COUNT: 1.76 K/UL (ref 1.5–8.1)
WBC # BLD: 3.9 K/UL (ref 3.5–11.3)
WBC # BLD: ABNORMAL 10*3/UL

## 2020-08-10 PROCEDURE — 36415 COLL VENOUS BLD VENIPUNCTURE: CPT

## 2020-08-10 PROCEDURE — 85025 COMPLETE CBC W/AUTO DIFF WBC: CPT

## 2020-08-14 ENCOUNTER — OFFICE VISIT (OUTPATIENT)
Dept: FAMILY MEDICINE CLINIC | Age: 55
End: 2020-08-14
Payer: COMMERCIAL

## 2020-08-14 VITALS
TEMPERATURE: 97.1 F | HEIGHT: 67 IN | BODY MASS INDEX: 20.88 KG/M2 | DIASTOLIC BLOOD PRESSURE: 68 MMHG | SYSTOLIC BLOOD PRESSURE: 110 MMHG | WEIGHT: 133 LBS | HEART RATE: 68 BPM

## 2020-08-14 PROCEDURE — 99214 OFFICE O/P EST MOD 30 MIN: CPT | Performed by: FAMILY MEDICINE

## 2020-08-14 RX ORDER — IBUPROFEN 800 MG/1
800 TABLET ORAL DAILY PRN
Qty: 120 TABLET | Refills: 3 | Status: SHIPPED | OUTPATIENT
Start: 2020-08-14 | End: 2021-04-28 | Stop reason: ALTCHOICE

## 2020-08-14 RX ORDER — M-VIT,TX,IRON,MINS/CALC/FOLIC 27MG-0.4MG
1 TABLET ORAL DAILY
COMMUNITY

## 2020-08-14 ASSESSMENT — ENCOUNTER SYMPTOMS
EYES NEGATIVE: 1
GASTROINTESTINAL NEGATIVE: 1
RESPIRATORY NEGATIVE: 1
ALLERGIC/IMMUNOLOGIC NEGATIVE: 1
BACK PAIN: 1

## 2020-08-14 ASSESSMENT — PATIENT HEALTH QUESTIONNAIRE - PHQ9
2. FEELING DOWN, DEPRESSED OR HOPELESS: 0
SUM OF ALL RESPONSES TO PHQ QUESTIONS 1-9: 0
1. LITTLE INTEREST OR PLEASURE IN DOING THINGS: 0
SUM OF ALL RESPONSES TO PHQ9 QUESTIONS 1 & 2: 0
SUM OF ALL RESPONSES TO PHQ QUESTIONS 1-9: 0

## 2020-08-14 NOTE — PROGRESS NOTES
Subjective:      Patient ID: Beau Toure is a 62 y.o. female. [facility]     Routine follow up on chronic medical conditions, refills, and review of updated labs. Generally feeling well. No interval panic attacks. Mood seems stable. Generally feeling well at present. No bowel or bladder issues to report. I have reviewed the patient's medical history in detail and updated the computerized patient record. Suspect menopausal related hot flashes in the last year or so. No IBS concerns at present. Migraines fairly quiescent over the interval.     Recent back pain issue. Urgent care visits late may and again 7/5/19 for lumbar back pain. Medrol dose pack and flexeril in may seemed to help. She has had a prednisone burst, and has is planning to stop now due to side effects after a week and a half. Nausea, headache, fatigue, bad heartburn, feeling off and weak. Some wt. Gain. Currently with lower back pain ongoing. Variable , more right sided recently. Sometimes hard to walk due to pain. Ibuprofen helps. PT/dry needling has helped. She is considering PT again at present. Following ascus pap with ob/gyn dr. Wicho Kahn. Some colposcopy appointments. Last check looking better. Past Medical History:   Diagnosis Date    Atypical squamous cell changes of undetermined significance (ASCUS) on vaginal cytology with positive high risk human papilloma virus (HPV) 1/22/2019    Back pain, chronic 1981    Basal cell carcinoma     right shoulder    Depression     IBS (irritable bowel syndrome)     Low grade squamous intraepithelial lesion     converting to HGSIL 5/15.     Migraine     PTSD (post-traumatic stress disorder)     Rectocele     Varicose vein      Past Surgical History:   Procedure Laterality Date    CHOLECYSTECTOMY, LAPAROSCOPIC  2006    COLONOSCOPY  06/22/2012    COLONOSCOPY  8/25/2014    internal hemorrhoids Dr Livier Child [facility]   Larwance Mu UTERUS  01/29/2010    ENDOMETRIAL ABLATION      HYSTERECTOMY, TOTAL ABDOMINAL  08/20/2015    Due to pap with HGSIL    LEEP      OTHER SURGICAL HISTORY      Schlerotherapy    POSTERIOR CRUCIATE LIGAMENT REPAIR      RECTOCELE REPAIR  2013    UPPER GASTROINTESTINAL ENDOSCOPY  02/15/2006     Current Outpatient Medications   Medication Sig Dispense Refill    Multiple Vitamins-Minerals (THERAPEUTIC MULTIVITAMIN-MINERALS) tablet Take 1 tablet by mouth daily      escitalopram (LEXAPRO) 10 MG tablet TAKE 1 TABLET DAILY 90 tablet 1    estradiol (ESTRACE) 0.1 MG/GM vaginal cream INSERT 1/4 APPLICATORFUL (1GRAM) VAGINALLY 2 TIMES A  WEEK AT BEDTIME 42.5 g 3    vitamin D (CHOLECALCIFEROL) 1000 UNIT TABS tablet Take 1,000 Units by mouth daily      folic acid (FOLVITE) 018 MCG tablet Take 800 mcg by mouth daily      ibuprofen (ADVIL;MOTRIN) 800 MG tablet Take 800 mg by mouth daily as needed for Pain       No current facility-administered medications for this visit. Allergies   Allergen Reactions    Penicillins Rash     In past known as \"polycillin\"    Polycillin-N  [Ampicillin] Rash         Review of Systems   Constitutional: Negative. HENT: Negative. Negative for dental problem. Eyes: Negative. Respiratory: Negative. Cardiovascular: Negative. Gastrointestinal: Negative. Endocrine: Negative. Genitourinary: Negative. Musculoskeletal: Positive for back pain. Skin: Negative. Allergic/Immunologic: Negative. Neurological: Negative. Negative for headaches (no interval migraines!). Hematological: Negative. Psychiatric/Behavioral: Negative. The patient is not nervous/anxious (acute episodes reduced at present. ). Objective:   Physical Exam  Constitutional:       General: She is not in acute distress. Appearance: She is well-developed. HENT:      Head: Normocephalic and atraumatic.       Right Ear: External ear normal.      Left Ear: External ear normal.      Mouth/Throat:      Pharynx: No oropharyngeal exudate. Eyes:      General: No scleral icterus. Conjunctiva/sclera: Conjunctivae normal.   Neck:      Musculoskeletal: Neck supple. Thyroid: No thyromegaly. Cardiovascular:      Rate and Rhythm: Normal rate and regular rhythm. Heart sounds: Normal heart sounds. No murmur. Pulmonary:      Effort: Pulmonary effort is normal. No respiratory distress. Breath sounds: Normal breath sounds. No wheezing. Abdominal:      General: Bowel sounds are normal. There is no distension. Palpations: Abdomen is soft. Tenderness: There is no abdominal tenderness. There is no rebound. Musculoskeletal: Normal range of motion. General: No tenderness. Skin:     General: Skin is warm and dry. Findings: No erythema or rash. Neurological:      Mental Status: She is alert and oriented to person, place, and time. Psychiatric:         Behavior: Behavior normal.         Thought Content:  Thought content normal.         Judgment: Judgment normal.       /68 (Site: Left Upper Arm, Position: Sitting, Cuff Size: Small Adult)   Pulse 68   Temp 97.1 °F (36.2 °C) (Temporal)   Ht 5' 7\" (1.702 m)   Wt 133 lb (60.3 kg)   LMP  (LMP Unknown)   BMI 20.83 kg/m²    8/10/2020  9:16 AM - Dorian, [facility] Incoming Lab Results From "[employer]     Component  Value  Ref Range & Units  Status  Collected  Lab    WBC  3.9  3.5 - 11.3 k/uL  Final  08/10/2020  9:00 AM  [facility]    RBC  4.48  3.95 - 5.11 m/uL  Final  08/10/2020  9:00 AM  [facility]    Hemoglobin  13.6  11.9 - 15.1 g/dL  Final  08/10/2020  9:00 AM  [facility]    Hematocrit  40.7  36.3 - 47.1 %  Final  08/10/2020  9:00 AM  [facility]    MCV  90.8  82.6 - 102.9 fL  Final  08/10/2020  9:00 AM  [facility]    MCH  30.4  25.2 - 33.5 pg  Final  08/10/2020  9:00 AM  [facility]    MCHC  33.4  25.2 - 33.5 g/dL  Final  08/10/2020  9:00 AM  [facility]    RDW  12.2  11.8 - 14.4 %  Final  08/10/2020  9:00 AM  [facility]    Platelets  950  100 - 453 k/uL  Final  08/10/2020  9:00 AM  [facility]    MPV  10.4  8.1 - 13.5 fL  Final  08/10/2020  9:00 AM  [facility]    NRBC Automated  0.0  0.0 per 100 WBC  Final  08/10/2020  9:00 AM  [facility]    Differential Type  NOT REPORTED   Final  08/10/2020  9:00 AM  [facility]    Seg Neutrophils  45  36 - 65 %  Final  08/10/2020  9:00 AM  [facility]    Lymphocytes  44High    24 - 43 %  Final  08/10/2020  9:00 AM  [facility]    Monocytes  8  3 - 12 %  Final  08/10/2020  9:00 AM  [facility]    Eosinophils %  2  1 - 4 %  Final  08/10/2020  9:00 AM  [facility]    Basophils  1  0 - 2 %  Final  08/10/2020  9:00 AM  [facility]    Immature Granulocytes  0  0 %  Final  08/10/2020  9:00 AM  [facility]    Segs Absolute  1.76  1.50 - 8.10 k/uL  Final  08/10/2020  9:00 AM  [facility]    Absolute Lymph #  1.75  1.10 - 3.70 k/uL  Final  08/10/2020  9:00 AM  [facility]    Absolute Mono #  0.33  0.10 - 1.20 k/uL  Final  08/10/2020  9:00 AM  [facility]    Absolute Eos #  0.07  0.00 - 0.44 k/uL  Final  08/10/2020  9:00 AM  [facility]    Basophils Absolute  <0.03  0.00 - 0.20 k/uL  Final  08/10/2020  9:00 AM  [facility]    Absolute Immature Granulocyte  <0.03  0.00 - 0.30 k/uL  Final  08/10/2020  9:00 AM  [facility]    WBC Morphology  NOT REPORTED   Final  08/10/2020  9:00 AM  [facility]    RBC Morphology  NOT REPORTED   Final  08/10/2020  9:00 AM  [facility]    Platelet Estimate  NOT REPORTED          No acute osseous abnormality of the lumbar spine.  Osteopenia with mild to    moderate multilevel degenerative disc disease.               Assessment:       Encounter Diagnoses   Name Primary?     Recurrent major depressive disorder, in full remission ([facility] [address] Yes    Asymptomatic varicose veins of both lower extremities     Irritable bowel syndrome with diarrhea     HGSIL (high grade squamous intraepithelial lesion) on Pap smear of cervix     Vitamin D deficiency     Anxiety     Chronic bilateral low back pain without sciatica                Plan:     depression: doing well at present. Hx of ptsd. Plan to cont. lexapro daily. Working well for her at present. Varicose veins: no significant issues at present. IBS: fairly quiescent at present. No acute concerns. S/p partial hysterectomy for hgsil. Doing well at present. Discussed calcium supplement postmenopausal.   .         mammogram negative 01/05/2019. Anxiety: rare panic attacks acutely. No triggers we can find. Significant nausea associated with episodes. Refilling phenergan for prn use. No significant exacerbations over the last 6 months. more chronic/persistent lower back pain, recurrent. Not tolerating prednisone in the past due to side effects. .  Ibuprofen and PT have helped. Refilling ibuprofen. Will call if PT needed again. Vitamin D deficiency. Taking daily supplement . Improved to low normal last check 7/18    Due for colonoscopy update.  Will schedule

## 2020-08-17 ENCOUNTER — TELEPHONE (OUTPATIENT)
Dept: SURGERY | Age: 55
End: 2020-08-17

## 2020-08-17 NOTE — TELEPHONE ENCOUNTER
Mailed Dr Delta [place] colonoscopy paperwork for repeat colonoscopy. No new sxs or problems.   [phone] with scheduling at [facility]

## 2020-08-26 ENCOUNTER — TELEPHONE (OUTPATIENT)
Dept: SURGERY | Age: 55
End: 2020-08-26

## 2020-08-26 RX ORDER — BISACODYL 5 MG
10 TABLET, DELAYED RELEASE (ENTERIC COATED) ORAL ONCE
Qty: 2 TABLET | Refills: 0 | Status: SHIPPED | OUTPATIENT
Start: 2020-08-26 | End: 2020-08-26

## 2020-08-26 RX ORDER — POLYETHYLENE GLYCOL 3350, SODIUM CHLORIDE, SODIUM BICARBONATE, POTASSIUM CHLORIDE 420; 11.2; 5.72; 1.48 G/4L; G/4L; G/4L; G/4L
4000 POWDER, FOR SOLUTION ORAL ONCE
Qty: 4000 ML | Refills: 0 | Status: SHIPPED | OUTPATIENT
Start: 2020-08-26 | End: 2020-08-26

## 2020-08-26 NOTE — TELEPHONE ENCOUNTER
Spoke with patient at this time scheduled for a colonoscopy. Surgery instructions and bowel prep went over with patient at this time, denies any questions. Bowel prep sent to pharmacy of choice and all instructions mailed at this time. Acknowledges understanding of procedure and will call with any further questions. Colonoscopy:                   Screen-No     Diagnostic-Yes     Surgery Date: September 18, 2020    Surgery Time: [facility] will call patient with time of procedure    Pharmacy: [address] [place] [address]    Orders pended for you to sign and paperwork attached.

## 2020-09-08 NOTE — PROGRESS NOTES
Patient came to window stated that per DR. June Mack a few weeks ago if back pain was no better would refer to PT.  PT order placed

## 2020-09-10 ENCOUNTER — HOSPITAL ENCOUNTER (OUTPATIENT)
Dept: PHYSICAL THERAPY | Age: 55
Setting detail: THERAPIES SERIES
Discharge: HOME | End: 2020-09-10
Payer: COMMERCIAL

## 2020-09-10 PROCEDURE — 97140 MANUAL THERAPY 1/> REGIONS: CPT

## 2020-09-10 PROCEDURE — 97161 PT EVAL LOW COMPLEX 20 MIN: CPT

## 2020-09-10 ASSESSMENT — PAIN SCALES - GENERAL: PAINLEVEL_OUTOF10: 2

## 2020-09-10 ASSESSMENT — PAIN DESCRIPTION - DESCRIPTORS: DESCRIPTORS: ACHING

## 2020-09-10 ASSESSMENT — PAIN DESCRIPTION - ONSET: ONSET: AWAKENED FROM SLEEP

## 2020-09-10 ASSESSMENT — PAIN DESCRIPTION - ORIENTATION: ORIENTATION: RIGHT;LEFT

## 2020-09-10 ASSESSMENT — PAIN DESCRIPTION - PROGRESSION: CLINICAL_PROGRESSION: GRADUALLY WORSENING

## 2020-09-10 ASSESSMENT — PAIN DESCRIPTION - FREQUENCY: FREQUENCY: CONTINUOUS

## 2020-09-10 ASSESSMENT — PAIN DESCRIPTION - LOCATION: LOCATION: BACK

## 2020-09-10 ASSESSMENT — PAIN DESCRIPTION - PAIN TYPE: TYPE: CHRONIC PAIN;ACUTE PAIN

## 2020-09-10 NOTE — PROGRESS NOTES
Physical Therapy  Initial Assessment  Date: 9/10/2020  Patient Name: Kaylen Broderick  MRN: [id number]  : 1965          Restrictions       Subjective   General  Chart Reviewed: Yes  Referring Practitioner: Nemesio Cronin MD  Referral Date : 20  Diagnosis: M54.5, G89.29 (ICD-10-CM) - Chronic bilateral low back pain without sciatica  General Comment  Comments: Sitting was aggravating, better needed to lay down. Walking up to 30min up to 4miles, twisting difficult  PT Visit Information  PT Insurance Information: BCBS  Subjective  Subjective: Currently with lower back pain ongoing. Variable , more right sided recently. Sometimes hard to walk due to pain. Ibuprofen helps. PT/dry needling has helped. She is considering PT again at present. Pain Screening  Patient Currently in Pain: Yes  Pain Assessment  Pain Assessment: 0-10  Pain Level: 2(at worst 8/10)  Pain Type: Chronic pain;Acute pain  Pain Location: Back  Pain Orientation: Right;Left  Pain Descriptors: Aching  Pain Frequency: Continuous  Pain Onset: Awakened from sleep  Clinical Progression: Gradually worsening  Vital Signs  Patient Currently in Pain: Yes    Vision/Hearing       Orientation       Social/Functional History       Objective     Observation/Palpation  Posture: Fair  Palpation: moderate tightness and muscle spasm of bilateral Lumbar paraspinals    Spine  Lumbar: Trunk flexion to ankles, ext lack approx 25%, bilateral rotation [facility]    Strength RLE  Strength RLE: Exception  R Hip Flexion: 4+/5  R Hip ABduction: 5/5  R Hip ADduction: 5/5  R Knee Flexion: 5/5  R Knee Extension: 5/5  Strength LLE  Strength LLE: Exception  L Hip Flexion: 4+/5  L Hip ABduction: 4+/5  L Hip ADduction: 4+/5  L Knee Flexion: 5/5  L Knee Extension: 5/5     Additional Measures  Special Tests: prone lying/pressup increased Right PSIS pain.   Sacral torsion with Left sacral boarder spasm noted                                             Assessment   Conditions Requiring Skilled Therapeutic Intervention  Body structures, Functions, Activity limitations: Decreased functional mobility ; Increased pain;Decreased ROM; Decreased strength;Decreased posture  Assessment: Patient to the clinic with diagnosis of lumbar pain wtihout sciatica. Patient has slight decrease in strength, pelvic assymmetry limiting walking and ex tolerance. Prognosis: Good  Decision Making: Low Complexity  REQUIRES PT FOLLOW UP: Yes  Activity Tolerance  Activity Tolerance: Patient Tolerated treatment well         Plan   Plan  Times per week: 2-3x/week  Plan weeks: 6 weeks  Current Treatment Recommendations: Strengthening, Gait Training, Manual Therapy - Joint Manipulation, Patient/Caregiver Education & Training, ROM, Equipment Evaluation, Education, & procurement, Modalities, Pain Management, Neuromuscular Re-education, Functional Mobility Training, Manual Therapy - Soft Tissue Mobilization, Home Exercise Program, Integrated Dry Needling    G-Code       OutComes Score                                                  AM-PAC Score             Goals  Short term goals  Time Frame for Short term goals: 3 weeks  Short term goal 1: Initaite HEP  Long term goals  Time Frame for Long term goals : 6 weeks  Long term goal 1: 6 weeks  Long term goal 2: Indpendent in HEP  Long term goal 3: Improve LE strength  5/5  Long term goal 4: Patient to be able to walk up to 4 mile without increase in pain. Long term goal 5: Patient to be able to return to ex without increase in pain.   Patient Goals   Patient goals : Decrease Pain, Decrease Tight muscles       Therapy Time   Individual Concurrent Group Co-treatment   Time In 1005         Time Out 1050         Minutes 45         Timed Code Treatment Minutes: 15 [facility], PT

## 2020-09-10 NOTE — FLOWSHEET NOTE
Physical Therapy Daily Treatment Note    Date:  9/10/2020    Patient Name:  Gonzalez El    :  1965  MRN: [id number]  Restrictions/Precautions:     Medical/Treatment Diagnosis Information:   · Diagnosis: M54.5, G89.29 (ICD-10-CM) - Chronic bilateral low back pain without sciatica  ·    Insurance/Certification information:  PT Insurance Information: Michelle Bynum  Physician Information:  Referring Practitioner: Jolanta Lopez MD  Plan of care signed (Y/N):  n  Visit# / total visits: 1 /10  Pain level: 2/10     Time In: 1005  Time Out:1050    Progress Note: [x]  Yes  []  No  Next due by: Visit #10      Subjective:   See Eval   Objective: See Eval   Observation:   Test measurements:      Exercises:   Exercise/Equipment Resistance/Repetitions Other comments        Prone prop  HEP    Prone pressup   HEP    PKB  HEP    Prone hip ext      Standing Trunk Ext  HEP    Abd Braceing      PPT      LTR       Bridging                     MET Correction  5' PA mob of the Left sacral boarder   [] Provided verbal/tactile cueing for activities related to strengthening, flexibility, endurance, ROM. (55572)  [] Provided verbal/tactile cueing for activities related to improving balance, coordination, kinesthetic sense, posture, motor skill, proprioception. (41582)    Therapeutic Activities:     [] Therapeutic activities, direct (one-on-one) patient contact (use of dynamic activities to improve functional performance). (05510)    Gait:   [] Provided training and instruction to the patient for ambulation re-education. (52765)    Self-Care/ADL's  [] Self-care/home management training and compensatory training, meal preparation, safety procedures, and instructions in use of assistive technology devices/adaptive equipment, direct one-on-one contact.  (11920)    Home Exercise Program:     [] Reviewed/Progressed HEP activities related to strengthening, flexibility, endurance, ROM. (61433)  [] Reviewed/Progressed HEP activities related to

## 2020-09-10 NOTE — PLAN OF CARE
Excellent [x] Good [] Fair  [] Poor     Electronically signed by:  Aletha Gallego PT    If you have any questions or concerns, please don't hesitate to call.   Thank you for your referral.      Physician Signature:________________________________Date:__________________  By signing above, therapists plan is approved by physician

## 2020-09-17 ENCOUNTER — APPOINTMENT (OUTPATIENT)
Dept: PHYSICAL THERAPY | Age: 55
End: 2020-09-17
Payer: COMMERCIAL

## 2020-09-18 ENCOUNTER — HOSPITAL ENCOUNTER (OUTPATIENT)
Dept: PHYSICAL THERAPY | Age: 55
Setting detail: THERAPIES SERIES
Discharge: HOME | End: 2020-09-18
Payer: COMMERCIAL

## 2020-09-18 PROCEDURE — 97140 MANUAL THERAPY 1/> REGIONS: CPT

## 2020-09-18 PROCEDURE — G0283 ELEC STIM OTHER THAN WOUND: HCPCS

## 2020-09-18 PROCEDURE — 97110 THERAPEUTIC EXERCISES: CPT

## 2020-09-18 NOTE — FLOWSHEET NOTE
Physical Therapy Daily Treatment Note    Date:  2020    Patient Name:  Beau Toure    :  1965  MRN: [id number]  Restrictions/Precautions:     Medical/Treatment Diagnosis Information:   · Diagnosis: M54.5, G89.29 (ICD-10-CM) - Chronic bilateral low back pain without sciatica     Insurance/Certification information:  PT Insurance Information: Merline Palin  Physician Information:  Referring Practitioner: Chino Catalan MD  Plan of care signed (Y/N):  n  Visit# / total visits: 2/10  Pain level: 2/10     Time In: 235  Time Out:325    Progress Note: [x]  Yes  []  No  Next due by: Visit #10      Subjective:  Patient reporting feeling better, has had less symptoms since last session. Objective: CAMPBELL per flow sheet to improve Pelvic symmetry, increase core strength and decrease pain. Patient left high, PA of the Left sacral boarder, shot gun technique. Left LE long prior to treatment, = after. Observation:   Test measurements:      Exercises:   Exercise/Equipment Resistance/Repetitions Other comments        Prone prop  HEP    Prone pressup  3 X 10  HEP    PKB 3 x 10  HEP    Prone hip ext  X 15     Standing Trunk Ext  HEP    Abd Braceing  5\" x 10     PPT  5\" x 10     LTR  5\" x 10      Bridging  5\" x 10  Wide/narrow                   MET Correction  10' PA mob of the Left sacral boarder, shot gun technique    [] Provided verbal/tactile cueing for activities related to strengthening, flexibility, endurance, ROM. (21015)  [] Provided verbal/tactile cueing for activities related to improving balance, coordination, kinesthetic sense, posture, motor skill, proprioception. (10285)    Therapeutic Activities:     [] Therapeutic activities, direct (one-on-one) patient contact (use of dynamic activities to improve functional performance). (09432)    Gait:   [] Provided training and instruction to the patient for ambulation re-education.  (56484)    Self-Care/ADL's  [] Self-care/home management training and compensatory training, meal preparation, safety procedures, and instructions in use of assistive technology devices/adaptive equipment, direct one-on-one contact. (03252)    Home Exercise Program:     [] Reviewed/Progressed HEP activities related to strengthening, flexibility, endurance, ROM. (59113)  [] Reviewed/Progressed HEP activities related to improving balance, coordination, kinesthetic sense, posture, motor skill, proprioception.  (69201)    Manual Treatments:  -IDN performed this date by Bentley Crowe PT to decrease pain, trigger points, tone, and spasm as well as increase tissue extensibility. Specific placement and dose can be seen on separately scanned image. Note that extensive time spent with joint mobilization and myofascial release techniques used this date to decrease tone/spasm throughout lumbar spine and sacral boarder. [] Provided manual therapy to mobilize soft tissue/joints for the purpose of modulating pain, promoting relaxation,  increasing ROM, reducing/eliminating soft tissue swelling/inflammation/restriction, improving soft tissue extensibility. (62862)    Service Based Modalities:  10' IFC to the lumbar spine to decrease pain and muscle spasm. Timed Code Treatment Minutes:   8' man , 27' ex    Total Treatment Minutes:   48'    Treatment/Activity Tolerance:  [x] Patient tolerated treatment well [] Patient limited by fatique  [] Patient limited by pain  [] Patient limited by other medical complications  [] Other:     Prognosis: [x] Good [] Fair  [] Poor    Patient Requires Follow-up: [x] Yes  [] No      Goals:  Short term goals  Time Frame for Short term goals: 3 weeks  Short term goal 1: Initaite HEP    Long term goals  Time Frame for Long term goals : 6 weeks  Long term goal 1: 6 weeks  Long term goal 2: Indpendent in HEP  Long term goal 3: Improve LE strength  5/5  Long term goal 4: Patient to be able to walk up to 4 mile without increase in pain.   Long term goal 5: Patient to be able to return to ex without increase in pain.           Plan:   [x] Continue per plan of care [] Alter current plan (see comments)  [] Plan of care initiated [] Hold pending MD visit [] Discharge  Plan for Next Session:      Electronically signed by:  Donn Courtney

## 2020-09-22 ENCOUNTER — HOSPITAL ENCOUNTER (OUTPATIENT)
Dept: PHYSICAL THERAPY | Age: 55
Setting detail: THERAPIES SERIES
Discharge: HOME | End: 2020-09-22
Payer: COMMERCIAL

## 2020-09-22 PROCEDURE — 97110 THERAPEUTIC EXERCISES: CPT

## 2020-09-22 PROCEDURE — 97140 MANUAL THERAPY 1/> REGIONS: CPT

## 2020-09-22 NOTE — FLOWSHEET NOTE
Physical Therapy Daily Treatment Note    Date:  2020    Patient Name:  Maria D Vee    :  1965  MRN: [id number]  Restrictions/Precautions:     Medical/Treatment Diagnosis Information:   · Diagnosis: M54.5, G89.29 (ICD-10-CM) - Chronic bilateral low back pain without sciatica     Insurance/Certification information:  PT Insurance Information: [employer]  Physician Information:  Referring Practitioner: Shani Jacome MD  Plan of care signed (Y/N):  y  Visit# / total visits: 3/10  Pain level: 2/10     Time In: 335  Time Out:425    Progress Note: []  Yes  [x]  No  Next due by: Visit #10      Subjective:  Patient reporting feeling better, has had less symptoms since last session. Patient noting has the occasion with sitting for prolonged periods. Objective: CAMPBELL per flow sheet to improve Pelvic symmetry, increase core strength and decrease pain. Patient left mild inflare. Equal leg length. Right PSIS high, mild to moderate tightness of the Right sacral boarder. Patient requiring verbal cueing for proper technique with exs. Trialed US to the Right sacral border to decrease pain and tightness.           Observation:   Test measurements:      Exercises:   Exercise/Equipment Resistance/Repetitions Other comments        Prone prop  HEP    Prone pressup  3 X 10  HEP    PKB 3 x 10  HEP    Prone hip ext  X 15     Standing Trunk Ext  HEP    Abd Braceing  5\" x 10     PPT  5\" x 10     LTR  5\" x 10      Bridging  5\" x 10  Wide/narrow                   MET Correction  10' PA mob of the Left sacral boarder, shot gun technique    [] Provided verbal/tactile cueing for activities related to strengthening, flexibility, endurance, ROM. (59243)  [] Provided verbal/tactile cueing for activities related to improving balance, coordination, kinesthetic sense, posture, motor skill, proprioception. (96869)    Therapeutic Activities:     [] Therapeutic activities, direct (one-on-one) patient contact (use of dynamic activities to improve functional performance). (79042)    Gait:   [] Provided training and instruction to the patient for ambulation re-education. (37382)    Self-Care/ADL's  [] Self-care/home management training and compensatory training, meal preparation, safety procedures, and instructions in use of assistive technology devices/adaptive equipment, direct one-on-one contact. (64002)    Home Exercise Program:     [] Reviewed/Progressed HEP activities related to strengthening, flexibility, endurance, ROM. (40581)  [] Reviewed/Progressed HEP activities related to improving balance, coordination, kinesthetic sense, posture, motor skill, proprioception.  (64139)    Manual Treatments:  -IDN performed this date by Kang Noyola PT to decrease pain, trigger points, tone, and spasm as well as increase tissue extensibility. Specific placement and dose can be seen on separately scanned image. Note that extensive time spent with joint mobilization and myofascial release techniques used this date to decrease tone/spasm throughout lumbar spine and sacral boarder. [] Provided manual therapy to mobilize soft tissue/joints for the purpose of modulating pain, promoting relaxation,  increasing ROM, reducing/eliminating soft tissue swelling/inflammation/restriction, improving soft tissue extensibility.  (70332)    Service Based Modalities:      Timed Code Treatment Minutes:   US 1MHZ 1.0 w/cm2 x 8min, 10' man , 28' ex    Total Treatment Minutes:   48'    Treatment/Activity Tolerance:  [x] Patient tolerated treatment well [] Patient limited by fatique  [] Patient limited by pain  [] Patient limited by other medical complications  [] Other:     Prognosis: [x] Good [] Fair  [] Poor    Patient Requires Follow-up: [x] Yes  [] No      Goals:  Short term goals  Time Frame for Short term goals: 3 weeks  Short term goal 1: Initaite HEP    Long term goals  Time Frame for Long term goals : 6 weeks  Long term goal 1: 6 weeks  Long term goal 2: Indpendent in HEP  Long term goal 3: Improve LE strength  5/5  Long term goal 4: Patient to be able to walk up to 4 mile without increase in pain. Long term goal 5: Patient to be able to return to ex without increase in pain.           Plan:   [x] Continue per plan of care [] Alter current plan (see comments)  [] Plan of care initiated [] Hold pending MD visit [] Discharge  Plan for Next Session:      Electronically signed by:  Monda Olszewski

## 2020-10-01 ENCOUNTER — HOSPITAL ENCOUNTER (OUTPATIENT)
Dept: PHYSICAL THERAPY | Age: 55
Setting detail: THERAPIES SERIES
Discharge: HOME | End: 2020-10-01
Payer: COMMERCIAL

## 2020-10-01 PROCEDURE — 97110 THERAPEUTIC EXERCISES: CPT

## 2020-10-01 NOTE — FLOWSHEET NOTE
Physical Therapy Daily Treatment Note    Date:  10/1/2020    Patient Name:  Nitin Hernandez    :  1965  MRN: [id number]  Restrictions/Precautions:     Medical/Treatment Diagnosis Information:   · Diagnosis: M54.5, G89.29 (ICD-10-CM) - Chronic bilateral low back pain without sciatica     Insurance/Certification information:  PT Insurance Information: [employer]  Physician Information:  Referring Practitioner: Jacqui Morris MD  Plan of care signed (Y/N):  y  Visit# / total visits: 4/10  Pain level: 2/10     Time In: 1  Time Out:1055    Progress Note: []  Yes  [x]  No  Next due by: Visit #10      Subjective:  Patient reporting feeling better, had one bad day. Patient noting has not restarted exs to date. Completing HEP. Objective: CAMPBELL per flow sheet to improve Pelvic symmetry, increase core strength and decrease pain. Equal leg length. Left PSIS high. Patient requiring verbal cueing for proper technique with exs. Able to progress several exs.           Observation:   Test measurements:      Exercises:   Exercise/Equipment Resistance/Repetitions Other comments        Prone prop  HEP    Prone pressup  3 X 10  HEP    PKB 3 x 10  HEP    Prone hip ext  X 15     Prone Alt UE/LE  15x    Prone LE  15x    Modified Trunk Ext  15x    Stanidng Hip Abd/ext  15x ea     Squats 15x    Standing Trunk Ext 15x HEP    Abd Braceing  5\" x 15     PPT  5\" x 15    LTR  5\" x 15      Bridging  5\" x 15 Wide/narrow         Cat/Camel  5\" x 10          MET Correction  3' PA mob of the Left sacral boarder, shot gun technique    [] Provided verbal/tactile cueing for activities related to strengthening, flexibility, endurance, ROM. (20917)  [] Provided verbal/tactile cueing for activities related to improving balance, coordination, kinesthetic sense, posture, motor skill, proprioception. (21160)    Therapeutic Activities:     [] Therapeutic activities, direct (one-on-one) patient contact (use of dynamic activities to improve functional performance). (08414)    Gait:   [] Provided training and instruction to the patient for ambulation re-education. (67312)    Self-Care/ADL's  [] Self-care/home management training and compensatory training, meal preparation, safety procedures, and instructions in use of assistive technology devices/adaptive equipment, direct one-on-one contact. (97775)    Home Exercise Program:     [] Reviewed/Progressed HEP activities related to strengthening, flexibility, endurance, ROM. (54960)  [] Reviewed/Progressed HEP activities related to improving balance, coordination, kinesthetic sense, posture, motor skill, proprioception.  (70384)    Manual Treatments:     Note that extensive time spent with joint mobilization and myofascial release techniques used this date to decrease tone/spasm throughout lumbar spine and sacral boarder. [] Provided manual therapy to mobilize soft tissue/joints for the purpose of modulating pain, promoting relaxation,  increasing ROM, reducing/eliminating soft tissue swelling/inflammation/restriction, improving soft tissue extensibility. (01075)    Service Based Modalities:      Timed Code Treatment Minutes:   48' ex     Total Treatment Minutes:   48'    Treatment/Activity Tolerance:  [x] Patient tolerated treatment well [] Patient limited by fatique  [] Patient limited by pain  [] Patient limited by other medical complications  [] Other:     Prognosis: [x] Good [] Fair  [] Poor    Patient Requires Follow-up: [x] Yes  [] No      Goals:  Short term goals  Time Frame for Short term goals: 3 weeks  Short term goal 1: Initaite HEP    Long term goals  Time Frame for Long term goals : 6 weeks  Long term goal 1: 6 weeks  Long term goal 2: Indpendent in [address] term goal 3: Improve LE strength  5/5  Long term goal 4: Patient to be able to walk up to 4 mile without increase in pain. Long term goal 5: Patient to be able to return to ex without increase in pain.           Plan:   [x] Continue per plan of care [] Alter current plan (see comments)  [] Plan of care initiated [] Hold pending MD visit [] Discharge  Plan for Next Session:  Robin Howard in 2 weeks, patient to restart exs. If does well, may recheck and place on Hold.      Electronically signed by:  Ashley Duncan

## 2020-10-15 ENCOUNTER — HOSPITAL ENCOUNTER (OUTPATIENT)
Dept: PHYSICAL THERAPY | Age: 55
Setting detail: THERAPIES SERIES
Discharge: HOME | End: 2020-10-15
Payer: COMMERCIAL

## 2020-10-15 PROCEDURE — 97110 THERAPEUTIC EXERCISES: CPT

## 2020-10-15 NOTE — FLOWSHEET NOTE
Physical Therapy Daily Treatment Note    Date:  10/15/2020    Patient Name:  Juli Tapia    :  1965  MRN: [id number]  Restrictions/Precautions:     Medical/Treatment Diagnosis Information:   · Diagnosis: M54.5, G89.29 (ICD-10-CM) - Chronic bilateral low back pain without sciatica     Insurance/Certification information:  PT Insurance Information: [employer]  Physician Information:  Referring Practitioner: Tyesha Elder MD  Plan of care signed (Y/N):  y  Visit# / total visits: 4/10  Pain level: 2/10     Time In: 1000  Time Out:1015    Progress Note: []  Yes  [x]  No  Next due by: Visit #10      Subjective:  Patient reporting feeling better, no difficulty with return to ex. Patient noting has not had any difficulty with ADLs. Currently off work x 1 month. Objective: Recheck completed this date. Plan to Hold x 1 motnh, patient has met all goals. Observation: = leg length, =PSIS  Test measurements:  5/5 Bilateral LE strength     Exercises:   Exercise/Equipment Resistance/Repetitions Other comments        Prone prop  HEP    Prone pressup  3 X 10  HEP    PKB 3 x 10  HEP    Prone hip ext  X 15     Prone Alt UE/LE  15x    Prone LE  15x    Modified Trunk Ext  15x    Stanidng Hip Abd/ext  15x ea     Squats 15x    Standing Trunk Ext 15x HEP    Abd Braceing  5\" x 15     PPT  5\" x 15    LTR  5\" x 15      Bridging  5\" x 15 Wide/narrow         Cat/Camel  5\" x 10          MET Correction  3' PA mob of the Left sacral boarder, shot gun technique    [] Provided verbal/tactile cueing for activities related to strengthening, flexibility, endurance, ROM. (19368)  [] Provided verbal/tactile cueing for activities related to improving balance, coordination, kinesthetic sense, posture, motor skill, proprioception. (50649)    Therapeutic Activities:     [] Therapeutic activities, direct (one-on-one) patient contact (use of dynamic activities to improve functional performance).  (15390)    Gait:   [] Provided training and instruction to the patient for ambulation re-education. (37835)    Self-Care/ADL's  [] Self-care/home management training and compensatory training, meal preparation, safety procedures, and instructions in use of assistive technology devices/adaptive equipment, direct one-on-one contact. (99868)    Home Exercise Program:     [] Reviewed/Progressed HEP activities related to strengthening, flexibility, endurance, ROM. (33354)  [] Reviewed/Progressed HEP activities related to improving balance, coordination, kinesthetic sense, posture, motor skill, proprioception.  (61256)    Manual Treatments:     Note that extensive time spent with joint mobilization and myofascial release techniques used this date to decrease tone/spasm throughout lumbar spine and sacral boarder. [] Provided manual therapy to mobilize soft tissue/joints for the purpose of modulating pain, promoting relaxation,  increasing ROM, reducing/eliminating soft tissue swelling/inflammation/restriction, improving soft tissue extensibility. (70327)    Service Based Modalities:      Timed Code Treatment Minutes:   48' ex     Total Treatment Minutes:   48'    Treatment/Activity Tolerance:  [x] Patient tolerated treatment well [] Patient limited by fatique  [] Patient limited by pain  [] Patient limited by other medical complications  [] Other:     Prognosis: [x] Good [] Fair  [] Poor    Patient Requires Follow-up: [x] Yes  [] No      Goals:  Short term goals  Time Frame for Short term goals: 3 weeks  Short term goal 1: Initaite HEP (met)    Long term goals  Time Frame for Long term goals : 6 weeks  Long term goal 1: 6 weeks  Long term goal 2: Indpendent in HEP (met)   Long term goal 3: Improve LE strength  5/5 (met 5/5)  Long term goal 4: Patient to be able to walk up to 4 mile without increase in pain.  (returned to walking with no difficulty)  Long term goal 5: Patient to be able to return to ex without increase in pain. (met)           Plan:   [] Continue per plan of care [] Alter current plan (see comments)  [] Plan of care initiated [x] Hold  [] Discharge  Plan for Next Session:  Hold x 4 weeks   Electronically signed by:  Makayla Lazo

## 2020-12-14 NOTE — DISCHARGE SUMMARY
[facility]    [] [place]  Phone: [phone]  Fax: [phone]      [] [place]  Phone: [phone]  Fax: [phone]    Physical Therapy Discharge Note  Date: 2020        Patient Name:  Roman [facility]    :  1965  MRN: [id number]  Restrictions/Precautions:     Medical/Treatment Diagnosis Information:   · Diagnosis: M54.5, G89.29 (ICD-10-CM) - Chronic bilateral low back pain without sciatica  ·   Insurance/Certification information:  PT Insurance Information: [facility]  Physician Information:  Referring Practitioner: Edison Garcia MD  Plan of care signed (Y/N):  y  Visit# / total visits: 4/10  Pain level:      2/10         Time Period for Report: 9/10/20-10/15/20  Cancels/No-shows to date:  0    Plan of Care/Treatment to date:  [x] Therapeutic Exercise    [x] Modalities:  [x] Therapeutic Activity     [] Ultrasound  [] Electrical Stimulation  [x] Gait Training      [] Cervical Traction    [] Lumbar Traction  [x] Neuromuscular Re-education  [] Cold/hotpack [] Iontophoresis  [x] Instruction in HEP      Other:  [x] Manual Therapy       []    [] Aquatic Therapy       []                              Subjective:        Patient reporting feeling better, no difficulty with return to ex. Patient noting has not had any difficulty with ADLs. Currently off work x 1 month.      Objective: Recheck completed this date. Plan to Hold x 1 motnh, patient has met all goals. · Observation: = leg length, =PSIS  Test measurements:  5/5 Bilateral LE strength      Assessment:   Noting 100% improvement, all goals met placed on Hold x 1 month.     Plan:    Patient did not return, DC PT        Goals:    Short term goals  Time Frame for Short term goals: 3 weeks  Short term goal 1: Initaite HEP (met)     Long term goals  Time Frame for Long term goals : 6 weeks  Long term goal 1: 6 weeks  Long term goal 2: Indpendent in HEP (met)   Long term goal 3: Improve LE strength

## 2020-12-16 ENCOUNTER — OFFICE VISIT (OUTPATIENT)
Dept: OBGYN | Age: 55
End: 2020-12-16
Payer: COMMERCIAL

## 2020-12-16 ENCOUNTER — HOSPITAL ENCOUNTER (OUTPATIENT)
Age: 55
Setting detail: SPECIMEN
Discharge: HOME | End: 2020-12-16
Payer: COMMERCIAL

## 2020-12-16 VITALS
WEIGHT: 136 LBS | HEART RATE: 86 BPM | DIASTOLIC BLOOD PRESSURE: 78 MMHG | SYSTOLIC BLOOD PRESSURE: 124 MMHG | BODY MASS INDEX: 21.35 KG/M2 | HEIGHT: 67 IN | RESPIRATION RATE: 20 BRPM

## 2020-12-16 PROBLEM — Z12.4 PAP SMEAR FOR CERVICAL CANCER SCREENING: Status: ACTIVE | Noted: 2020-12-16

## 2020-12-16 PROCEDURE — 87624 HPV HI-RISK TYP POOLED RSLT: CPT

## 2020-12-16 PROCEDURE — 99214 OFFICE O/P EST MOD 30 MIN: CPT | Performed by: OBSTETRICS & GYNECOLOGY

## 2020-12-16 PROCEDURE — G0145 SCR C/V CYTO,THINLAYER,RESCR: HCPCS

## 2020-12-16 NOTE — PROGRESS NOTES
escitalopram (LEXAPRO) 10 MG tablet, TAKE 1 TABLET DAILY, Disp: 90 tablet, Rfl: 1    estradiol (ESTRACE) 0.1 MG/GM vaginal cream, INSERT 1/4 APPLICATORFUL (1GRAM) VAGINALLY 2 TIMES A  WEEK AT BEDTIME, Disp: 42.5 g, Rfl: 3    vitamin D (CHOLECALCIFEROL) 1000 UNIT TABS tablet, Take 1,000 Units by mouth daily, Disp: , Rfl:     folic acid (FOLVITE) 914 MCG tablet, Take 800 mcg by mouth daily, Disp: , Rfl:       Review of Systems   All other systems reviewed and are negative. Breast ROS: negative    Objective:   /78 (Site: Right Upper Arm, Position: Sitting, Cuff Size: Medium Adult)   Pulse 86   Resp 20   Ht 5' 7\" (1.702 m)   Wt 136 lb (61.7 kg)   LMP  (LMP Unknown)   BMI 21.30 kg/m²     Physical Exam  Vitals signs and nursing note reviewed. Exam conducted with a chaperone present. Constitutional:       General: She is not in acute distress. Appearance: Normal appearance. HENT:      Head: Normocephalic. Nose: Nose normal.      Mouth/Throat:      Mouth: Mucous membranes are moist.   Eyes:      Conjunctiva/sclera: Conjunctivae normal.      Pupils: Pupils are equal, round, and reactive to light. Neck:      Musculoskeletal: Normal range of motion and neck supple. Pulmonary:      Effort: Pulmonary effort is normal. No respiratory distress. Abdominal:      General: Abdomen is flat. There is no distension. Palpations: Abdomen is soft. Tenderness: There is no abdominal tenderness. There is no guarding. Genitourinary:     General: Normal vulva. Vagina: No vaginal discharge. Musculoskeletal: Normal range of motion. General: No swelling or deformity. Skin:     General: Skin is warm. Neurological:      General: No focal deficit present. Mental Status: She is alert and oriented to person, place, and time.    Psychiatric:         Mood and Affect: Mood normal.         Behavior: Behavior normal.       Breast exam: WNL, No skin retraction, dimpling or skin discoloration. No nippledischarge. Any bleeding or pain withintercourse: No      Do you do self breast exams: Yes    Assessment:      Diagnosis Orders   1. Pap smear for cervical cancer screening  PAP SMEAR   2. History of hysterectomy  PAP SMEAR           Plan:     Orders Placed This Encounter   Procedures    PAP SMEAR     Patient History:    No LMP recorded (lmp unknown). Patient has had a hysterectomy. OBGYN Status: Hysterectomy  Past Surgical History:  2006: CHOLECYSTECTOMY, LAPAROSCOPIC  06/22/2012: COLONOSCOPY  8/25/2014: COLONOSCOPY      Comment:  internal hemorrhoids Dr Pravin Martinez [facility]  01/29/2010: [facility]  No date: ENDOMETRIAL ABLATION  08/20/2015: HYSTERECTOMY, TOTAL ABDOMINAL      Comment:  Due to pap with HGSIL  No date: LEEP  No date: OTHER SURGICAL HISTORY      Comment:  Schlerotherapy  No date: POSTERIOR CRUCIATE LIGAMENT REPAIR  2013: [address]  02/15/2006: UPPER GASTROINTESTINAL ENDOSCOPY      Social History    Tobacco Use      Smoking status: Never Smoker      Smokeless tobacco: Never Used       Standing Status:   Future     Standing Expiration Date:   12/16/2021     Order Specific Question:   Collection Type     Answer:   Conventional     Order Specific Question:   Prior Abnormal Pap Test     Answer:   No     Order Specific Question:   Screening or Diagnostic     Answer:   Screening     Order Specific Question:   HPV Requested? Answer:   Yes - If Abnormal Reflex HPV     Order Specific Question:   High Risk Patient     Answer:   N/A     Once this Pap smear comes back normal then the follow-up would be yearly checkups and Pap smears.        Danyell Montgomery MD

## 2020-12-28 RX ORDER — ESCITALOPRAM OXALATE 10 MG/1
TABLET ORAL
Qty: 90 TABLET | Refills: 1 | Status: SHIPPED | OUTPATIENT
Start: 2020-12-28 | End: 2021-09-02 | Stop reason: SDUPTHER

## 2020-12-30 LAB — CYTOLOGY REPORT: NORMAL

## 2021-01-05 DIAGNOSIS — Z12.31 ENCOUNTER FOR SCREENING MAMMOGRAM FOR BREAST CANCER: Primary | ICD-10-CM

## 2021-01-13 ENCOUNTER — PROCEDURE VISIT (OUTPATIENT)
Dept: OBGYN | Age: 56
End: 2021-01-13
Payer: COMMERCIAL

## 2021-01-13 ENCOUNTER — HOSPITAL ENCOUNTER (OUTPATIENT)
Age: 56
Setting detail: SPECIMEN
Discharge: HOME | End: 2021-01-13
Payer: COMMERCIAL

## 2021-01-13 VITALS
SYSTOLIC BLOOD PRESSURE: 128 MMHG | BODY MASS INDEX: 21.82 KG/M2 | DIASTOLIC BLOOD PRESSURE: 70 MMHG | HEIGHT: 67 IN | HEART RATE: 78 BPM | RESPIRATION RATE: 20 BRPM | WEIGHT: 139 LBS

## 2021-01-13 DIAGNOSIS — R87.622 LOW GRADE SQUAMOUS INTRAEPITH LESION ON CYTOLOGIC SMEAR VAGINA (LGSIL): Primary | ICD-10-CM

## 2021-01-13 DIAGNOSIS — R87.622 LOW GRADE SQUAMOUS INTRAEPITH LESION ON CYTOLOGIC SMEAR VAGINA (LGSIL): ICD-10-CM

## 2021-01-13 DIAGNOSIS — Z90.710 HISTORY OF HYSTERECTOMY: ICD-10-CM

## 2021-01-13 PROCEDURE — 57421 EXAM/BIOPSY OF VAG W/SCOPE: CPT | Performed by: OBSTETRICS & GYNECOLOGY

## 2021-01-13 PROCEDURE — 88305 TISSUE EXAM BY PATHOLOGIST: CPT

## 2021-01-13 RX ORDER — ESTRADIOL 0.1 MG/G
CREAM VAGINAL
Qty: 42.5 G | Refills: 3 | Status: SHIPPED | OUTPATIENT
Start: 2021-01-13 | End: 2021-04-28 | Stop reason: SDUPTHER

## 2021-01-13 NOTE — PROGRESS NOTES
Colposcopy Procedure Note  Colposcope is used for this procedure. Date of service: 2021    Pete Addison  Is a 60 y.o.  female    PT's PCP is: Elsie Kanner, MD     : 1965   Chief Complaint   Patient presents with    Procedure     colpo     Past Medical History:   Diagnosis Date    Atypical squamous cell changes of undetermined significance (ASCUS) on vaginal cytology with positive high risk human papilloma virus (HPV) 2019    Back pain, chronic 1981    Basal cell carcinoma     right shoulder    Depression     IBS (irritable bowel syndrome)     Low grade squamous intraepithelial lesion     converting to HGSIL 5/15.  Migraine     PTSD (post-traumatic stress disorder)     Rectocele     Varicose vein      Past Surgical History:   Procedure Laterality Date    CHOLECYSTECTOMY, LAPAROSCOPIC      COLONOSCOPY  2012    COLONOSCOPY  2014    internal hemorrhoids Dr Lalita Larson [facility]    Major Lesser  2010    ENDOMETRIAL ABLATION      HYSTERECTOMY, TOTAL ABDOMINAL  2015    Due to pap with HGSIL    LEEP      OTHER SURGICAL HISTORY      Schlerotherapy    POSTERIOR CRUCIATE LIGAMENT REPAIR      RECTOCELE REPAIR      UPPER GASTROINTESTINAL ENDOSCOPY  02/15/2006     Family History   Problem Relation Age of Onset    Glaucoma Neg Hx     Diabetes Neg Hx     Cataracts Neg Hx       reports that she has never smoked. She has never used smokeless tobacco. She reports current alcohol use. She reports that she does not use drugs.   Allergies   Allergen Reactions    Penicillins Rash     In past known as \"polycillin\"    Polycillin-N  [Ampicillin] Rash     Current Outpatient Medications   Medication Sig Dispense Refill    estradiol (ESTRACE) 0.1 MG/GM vaginal cream INSERT 1/4 APPLICATORFUL (1GRAM) VAGINALLY 2 TIMES A  WEEK AT BEDTIME 42.5 g 3    escitalopram (LEXAPRO) 10 MG tablet TAKE 1 TABLET DAILY 90 tablet 1    Multiple Vitamins-Minerals (THERAPEUTIC MULTIVITAMIN-MINERALS) tablet Take 1 tablet by mouth daily      ibuprofen (ADVIL;MOTRIN) 800 MG tablet Take 1 tablet by mouth daily as needed for Pain 120 tablet 3    vitamin D (CHOLECALCIFEROL) 1000 UNIT TABS tablet Take 1,000 Units by mouth daily      folic acid (FOLVITE) 803 MCG tablet Take 800 mcg by mouth daily       No current facility-administered medications for this visit. /70 (Site: Left Upper Arm, Position: Sitting, Cuff Size: Medium Adult)   Pulse 78   Resp 20   Ht 5' 7\" (1.702 m)   Wt 139 lb (63 kg)   LMP  (LMP Unknown)   BMI 21.77 kg/m²     Review of Systems   All other systems reviewed and are negative. Indications: Pap smear 1 months ago showed: low-grade squamous intraepithelial neoplasia (LGSIL - encompassing HPV,mild dysplasia,KASI I). The prior pap showed no abnormalities. Prior cervical/vaginal disease: KASI I Prior cervical treatment: Hysterectomy    NURSE: Marly Workman    Procedure Details   The risks and benefits of the procedure and Written informed consent obtained. Speculum placed in vagina and excellent visualization of cervix achieved,swabbed x 3 with acetic acid solution. Findings:  Vaginal inspection: lesion(s) noted at 3 & 9 o'clock, Benzocaine 20% spray applied to vagina and biopsies taken at 3 & 9 o'clock. hemostasis done by Monsel solution. Vulvar colposcopy: vulvar colposcopy not performed. Physical Exam  Vitals signs and nursing note reviewed. Exam conducted with a chaperone present. Constitutional:       General: She is not in acute distress. Appearance: Normal appearance. She is not ill-appearing. HENT:      Head: Normocephalic. Eyes:      Conjunctiva/sclera: Conjunctivae normal.      Pupils: Pupils are equal, round, and reactive to light. Neck:      Musculoskeletal: Normal range of motion and neck supple. Pulmonary:      Effort: Pulmonary effort is normal. No respiratory distress.    Abdominal:      General: There is no distension. Palpations: Abdomen is soft. Tenderness: There is no abdominal tenderness. There is no guarding. Genitourinary:     General: Normal vulva. Vagina: No vaginal discharge. Musculoskeletal: Normal range of motion. General: No deformity. Skin:     General: Skin is warm. Neurological:      General: No focal deficit present. Mental Status: She is alert and oriented to person, place, and time. Psychiatric:         Mood and Affect: Mood normal.         Behavior: Behavior normal.           Specimens: 2    Complications: none. Plan:     Diagnosis Orders   1. Low grade squamous intraepith lesion on cytologic smear vagina (lgsil)  Surgical Pathology    Surgical Pathology   2. History of hysterectomy  Surgical Pathology    Surgical Pathology             Specimens labelled and sent to Pathology. Will base further treatment on Pathology findings. Post biopsy instructions given to patient. No follow-ups on file.     TON HARO,1/13/2021 4:26 PM

## 2021-01-15 PROBLEM — Z12.4 PAP SMEAR FOR CERVICAL CANCER SCREENING: Status: RESOLVED | Noted: 2020-12-16 | Resolved: 2021-01-15

## 2021-01-15 LAB — SURGICAL PATHOLOGY REPORT: NORMAL

## 2021-01-18 ENCOUNTER — TELEPHONE (OUTPATIENT)
Dept: OBGYN | Age: 56
End: 2021-01-18

## 2021-01-18 NOTE — TELEPHONE ENCOUNTER
Spoke to patient and notified of appointment that needs scheduled. Patient added to list of callbacks.   Patient voices understanding that she will be called closer to appointment time

## 2021-01-19 ENCOUNTER — HOSPITAL ENCOUNTER (OUTPATIENT)
Dept: MAMMOGRAPHY | Age: 56
Discharge: HOME | End: 2021-01-21
Payer: COMMERCIAL

## 2021-01-19 DIAGNOSIS — Z12.31 ENCOUNTER FOR SCREENING MAMMOGRAM FOR BREAST CANCER: ICD-10-CM

## 2021-01-19 PROCEDURE — 77063 BREAST TOMOSYNTHESIS BI: CPT

## 2021-03-01 ENCOUNTER — OFFICE VISIT (OUTPATIENT)
Dept: FAMILY MEDICINE CLINIC | Age: 56
End: 2021-03-01
Payer: COMMERCIAL

## 2021-03-01 VITALS
SYSTOLIC BLOOD PRESSURE: 114 MMHG | TEMPERATURE: 98.9 F | BODY MASS INDEX: 21.9 KG/M2 | DIASTOLIC BLOOD PRESSURE: 70 MMHG | OXYGEN SATURATION: 99 % | WEIGHT: 139.5 LBS | HEIGHT: 67 IN | HEART RATE: 75 BPM

## 2021-03-01 DIAGNOSIS — E55.9 VITAMIN D DEFICIENCY: ICD-10-CM

## 2021-03-01 DIAGNOSIS — D36.9 ADENOMATOUS POLYP: ICD-10-CM

## 2021-03-01 DIAGNOSIS — R87.613 HGSIL (HIGH GRADE SQUAMOUS INTRAEPITHELIAL LESION) ON PAP SMEAR OF CERVIX: ICD-10-CM

## 2021-03-01 DIAGNOSIS — G89.29 CHRONIC BILATERAL LOW BACK PAIN WITHOUT SCIATICA: ICD-10-CM

## 2021-03-01 DIAGNOSIS — K58.0 IRRITABLE BOWEL SYNDROME WITH DIARRHEA: ICD-10-CM

## 2021-03-01 DIAGNOSIS — F41.9 ANXIETY: ICD-10-CM

## 2021-03-01 DIAGNOSIS — I83.93 ASYMPTOMATIC VARICOSE VEINS OF BOTH LOWER EXTREMITIES: ICD-10-CM

## 2021-03-01 DIAGNOSIS — Z12.11 ENCOUNTER FOR SCREENING COLONOSCOPY: Primary | ICD-10-CM

## 2021-03-01 DIAGNOSIS — M54.50 CHRONIC BILATERAL LOW BACK PAIN WITHOUT SCIATICA: ICD-10-CM

## 2021-03-01 PROCEDURE — 99214 OFFICE O/P EST MOD 30 MIN: CPT | Performed by: FAMILY MEDICINE

## 2021-03-01 ASSESSMENT — ENCOUNTER SYMPTOMS
ALLERGIC/IMMUNOLOGIC NEGATIVE: 1
RESPIRATORY NEGATIVE: 1
GASTROINTESTINAL NEGATIVE: 1
EYES NEGATIVE: 1
BACK PAIN: 1

## 2021-03-01 NOTE — PROGRESS NOTES
Subjective:      Patient ID: Jemal Bearden is a 60 y.o. female. HPI   Routine follow up on chronic medical conditions, refills, and review of updated labs. Generally feeling well. No interval panic attacks. Mood seems stable. Generally feeling well at present. No bowel or bladder issues to report. I have reviewed the patient's medical history in detail and updated the computerized patient record. Suspect menopausal related hot flashes in the last year or so. No IBS concerns at present. Migraines fairly quiescent over the interval.      Urgent care visits late may and again 7/5/19 for lumbar back pain. Medrol dose pack and flexeril in may seemed to help. She has had a prednisone burst, and has is planning to stop now due to side effects after a week and a half. Nausea, headache, fatigue, bad heartburn, feeling off and weak. Some wt. Gain. Currently with lower back pain ongoing. Variable , more right sided recently. Sometimes hard to walk due to pain. Ibuprofen helps. PT/dry needling has helped. She is considering PT again at present. Following ascus pap with ob/gyn dr. Jimmy Galo. Some colposcopy appointments. Last check looking better. Past Medical History:   Diagnosis Date    Atypical squamous cell changes of undetermined significance (ASCUS) on vaginal cytology with positive high risk human papilloma virus (HPV) 1/22/2019    Back pain, chronic 1981    Basal cell carcinoma     right shoulder    Depression     IBS (irritable bowel syndrome)     Low grade squamous intraepithelial lesion     converting to HGSIL 5/15.     Migraine     PTSD (post-traumatic stress disorder)     Rectocele     Varicose vein      Past Surgical History:   Procedure Laterality Date    CHOLECYSTECTOMY, LAPAROSCOPIC  2006    COLONOSCOPY  06/22/2012    COLONOSCOPY  8/25/2014    internal hemorrhoids Dr Danita Wong [facility]   Jaymie Myles OF UTERUS  01/29/2010    ENDOMETRIAL ABLATION      exudate. Eyes:      General: No scleral icterus. Conjunctiva/sclera: Conjunctivae normal.   Neck:      Musculoskeletal: Neck supple. Thyroid: No thyromegaly. Cardiovascular:      Rate and Rhythm: Normal rate and regular rhythm. Heart sounds: Normal heart sounds. No murmur. Pulmonary:      Effort: Pulmonary effort is normal. No respiratory distress. Breath sounds: Normal breath sounds. No wheezing. Abdominal:      General: Bowel sounds are normal. There is no distension. Palpations: Abdomen is soft. Tenderness: There is no abdominal tenderness. There is no rebound. Musculoskeletal: Normal range of motion. General: No tenderness. Skin:     General: Skin is warm and dry. Findings: No erythema or rash. Neurological:      Mental Status: She is alert and oriented to person, place, and time. Psychiatric:         Behavior: Behavior normal.         Thought Content:  Thought content normal.         Judgment: Judgment normal.       /70   Pulse 75   Temp 98.9 °F (37.2 °C)   Ht 5' 7\" (1.702 m)   Wt 139 lb 8 oz (63.3 kg)   LMP  (LMP Unknown)   SpO2 99%   BMI 21.85 kg/m²    8/10/2020  9:16 AM - [facility], [facility] Incoming Lab Results From [facility]     Component  Value  Ref Range & Units  Status  Collected  Lab    WBC  3.9  3.5 - 11.3 k/uL  Final  08/10/2020  9:00 AM  [facility]    RBC  4.48  3.95 - 5.11 m/uL  Final  08/10/2020  9:00 AM  [facility]    Hemoglobin  13.6  11.9 - 15.1 g/dL  Final  08/10/2020  9:00 AM  [facility]    Hematocrit  40.7  36.3 - 47.1 %  Final  08/10/2020  9:00 AM  [facility]    MCV  90.8  82.6 - 102.9 fL  Final  08/10/2020  9:00 AM  [facility]    MCH  30.4  25.2 - 33.5 pg  Final  08/10/2020  9:00 AM  [facility]    MCHC  33.4  25.2 - 33.5 g/dL  Final  08/10/2020  9:00 AM  [facility]    RDW  12.2  11.8 - 14.4 %  Final  08/10/2020  9:00 AM  [facility]    Platelets  364  304 - 453 k/uL  Final  08/10/2020  9:00 AM  [facility]    MPV  10.4  8.1 - 13.5 fL  Final  08/10/2020  9:00 AM  [facility]    NRBC Automated  0.0  0.0 per 100 WBC  Final  08/10/2020  9:00 AM  [facility]    Differential Type  NOT REPORTED   Final  08/10/2020  9:00 AM  [facility]    Seg Neutrophils  45  36 - 65 %  Final  08/10/2020  9:00 AM  [facility]    Lymphocytes  44High    24 - 43 %  Final  08/10/2020  9:00 AM  [facility]    Monocytes  8  3 - 12 %  Final  08/10/2020  9:00 AM  [facility]    Eosinophils %  2  1 - 4 %  Final  08/10/2020  9:00 AM  [facility]    Basophils  1  0 - 2 %  Final  08/10/2020  9:00 AM  [facility]    Immature Granulocytes  0  0 %  Final  08/10/2020  9:00 AM  [facility]    Segs Absolute  1.76  1.50 - 8.10 k/uL  Final  08/10/2020  9:00 AM  [facility]    Absolute Lymph #  1.75  1.10 - 3.70 k/uL  Final  08/10/2020  9:00 AM  [facility]    Absolute Mono #  0.33  0.10 - 1.20 k/uL  Final  08/10/2020  9:00 AM  [facility]    Absolute Eos #  0.07  0.00 - 0.44 k/uL  Final  08/10/2020  9:00 AM  [facility]    Basophils Absolute  <0.03  0.00 - 0.20 k/uL  Final  08/10/2020  9:00 AM  [facility]    Absolute Immature Granulocyte  <0.03  0.00 - 0.30 k/uL  Final  08/10/2020  9:00 AM  [facility]    WBC Morphology  NOT REPORTED   Final  08/10/2020  9:00 AM  [facility]    RBC Morphology  NOT REPORTED   Final  08/10/2020  9:00 AM  [facility]    Platelet Estimate  NOT REPORTED          No acute osseous abnormality of the lumbar spine.  Osteopenia with mild to    moderate multilevel degenerative disc disease.               Assessment:       Encounter Diagnoses   Name Primary?     Encounter for screening colonoscopy Yes    Asymptomatic varicose veins of both lower extremities     Irritable bowel syndrome with diarrhea     HGSIL (high grade squamous intraepithelial lesion) on Pap smear of cervix     Anxiety     Chronic bilateral low back pain without sciatica     Vitamin D deficiency     Adenomatous polyp            Plan:     depression: doing well at present. Hx of ptsd. Plan to cont. lexapro daily. Working well for her at present. Varicose veins: no significant issues at present. IBS: fairly quiescent at present. No acute concerns. S/p partial hysterectomy for hgsil. Doing well at present. Discussed calcium supplement postmenopausal.   .         mammogram negative 01/05/2019. Anxiety: rare panic attacks acutely. No triggers we can find. Significant nausea associated with episodes. Refilling phenergan for prn use. No significant exacerbations over the last 6 months. more chronic/persistent lower back pain, recurrent. Not tolerating prednisone in the past due to side effects. .  Ibuprofen and PT have helped. Refilling ibuprofen. Will call if PT needed again. Vitamin D deficiency. Taking daily supplement . Improved to low normal last check 7/18    Due for colonoscopy update. Hyperplastic polyp/adenomatous polyp 6/22/2012,  Repeat 8/25/2014: no polyps. On media file, photos, dated 8/29/2014, Dr. Mike Gabriel wrote \"repeat colonoscopy in 5 yrs. Labs pending draw. Will review when available.

## 2021-03-02 ENCOUNTER — TELEPHONE (OUTPATIENT)
Dept: SURGERY | Age: 56
End: 2021-03-02

## 2021-03-09 ENCOUNTER — HOSPITAL ENCOUNTER (OUTPATIENT)
Dept: LAB | Age: 56
Discharge: HOME | End: 2021-03-09
Payer: COMMERCIAL

## 2021-03-09 DIAGNOSIS — E55.9 VITAMIN D DEFICIENCY: ICD-10-CM

## 2021-03-09 DIAGNOSIS — K58.0 IRRITABLE BOWEL SYNDROME WITH DIARRHEA: ICD-10-CM

## 2021-03-09 LAB
ABSOLUTE EOS #: 0.07 K/UL (ref 0–0.44)
ABSOLUTE IMMATURE GRANULOCYTE: <0.03 K/UL (ref 0–0.3)
ABSOLUTE LYMPH #: 1.47 K/UL (ref 1.1–3.7)
ABSOLUTE MONO #: 0.34 K/UL (ref 0.1–1.2)
ALBUMIN SERPL-MCNC: 4.6 G/DL (ref 3.5–5.2)
ALBUMIN/GLOBULIN RATIO: 1.6 (ref 1–2.5)
ALP BLD-CCNC: 82 U/L (ref 35–104)
ALT SERPL-CCNC: 15 U/L (ref 5–33)
ANION GAP SERPL CALCULATED.3IONS-SCNC: 8 MMOL/L (ref 9–17)
AST SERPL-CCNC: 21 U/L
BASOPHILS # BLD: 1 % (ref 0–2)
BASOPHILS ABSOLUTE: 0.03 K/UL (ref 0–0.2)
BILIRUB SERPL-MCNC: 0.4 MG/DL (ref 0.3–1.2)
BUN BLDV-MCNC: 13 MG/DL (ref 6–20)
BUN/CREAT BLD: 16 (ref 9–20)
CALCIUM SERPL-MCNC: 10.1 MG/DL (ref 8.6–10.4)
CHLORIDE BLD-SCNC: 105 MMOL/L (ref 98–107)
CO2: 30 MMOL/L (ref 20–31)
CREAT SERPL-MCNC: 0.8 MG/DL (ref 0.5–0.9)
DIFFERENTIAL TYPE: NORMAL
EOSINOPHILS RELATIVE PERCENT: 2 % (ref 1–4)
GFR AFRICAN AMERICAN: >60 ML/MIN
GFR NON-AFRICAN AMERICAN: >60 ML/MIN
GFR SERPL CREATININE-BSD FRML MDRD: ABNORMAL ML/MIN/{1.73_M2}
GFR SERPL CREATININE-BSD FRML MDRD: ABNORMAL ML/MIN/{1.73_M2}
GLUCOSE BLD-MCNC: 93 MG/DL (ref 70–99)
HCT VFR BLD CALC: 43.8 % (ref 36.3–47.1)
HEMOGLOBIN: 14.5 G/DL (ref 11.9–15.1)
IMMATURE GRANULOCYTES: 0 %
LYMPHOCYTES # BLD: 40 % (ref 24–43)
MCH RBC QN AUTO: 30.7 PG (ref 25.2–33.5)
MCHC RBC AUTO-ENTMCNC: 33.1 G/DL (ref 25.2–33.5)
MCV RBC AUTO: 92.6 FL (ref 82.6–102.9)
MONOCYTES # BLD: 9 % (ref 3–12)
NRBC AUTOMATED: 0 PER 100 WBC
PDW BLD-RTO: 11.9 % (ref 11.8–14.4)
PLATELET # BLD: 226 K/UL (ref 138–453)
PLATELET ESTIMATE: NORMAL
PMV BLD AUTO: 10.5 FL (ref 8.1–13.5)
POTASSIUM SERPL-SCNC: 5 MMOL/L (ref 3.7–5.3)
RBC # BLD: 4.73 M/UL (ref 3.95–5.11)
RBC # BLD: NORMAL 10*6/UL
SEG NEUTROPHILS: 48 % (ref 36–65)
SEGMENTED NEUTROPHILS ABSOLUTE COUNT: 1.78 K/UL (ref 1.5–8.1)
SODIUM BLD-SCNC: 143 MMOL/L (ref 135–144)
TOTAL PROTEIN: 7.4 G/DL (ref 6.4–8.3)
VITAMIN D 25-HYDROXY: 32.8 NG/ML (ref 30–100)
WBC # BLD: 3.7 K/UL (ref 3.5–11.3)
WBC # BLD: NORMAL 10*3/UL

## 2021-03-09 PROCEDURE — 80053 COMPREHEN METABOLIC PANEL: CPT

## 2021-03-09 PROCEDURE — 82306 VITAMIN D 25 HYDROXY: CPT

## 2021-03-09 PROCEDURE — 85025 COMPLETE CBC W/AUTO DIFF WBC: CPT

## 2021-03-09 PROCEDURE — 36415 COLL VENOUS BLD VENIPUNCTURE: CPT

## 2021-03-16 ENCOUNTER — TELEPHONE (OUTPATIENT)
Dept: SURGERY | Age: 56
End: 2021-03-16

## 2021-03-25 ENCOUNTER — TELEPHONE (OUTPATIENT)
Dept: SURGERY | Age: 56
End: 2021-03-25

## 2021-03-25 NOTE — TELEPHONE ENCOUNTER
Patient had returned my call, I left her about scheduling screening mail in colonoscopy, she stated she has just started a new job, and would like to wait till she is settled and all her insurance is on board and she knows who all is in network and at that time she will return our calls again and schedule the maybe more towards June.

## 2021-04-28 ENCOUNTER — OFFICE VISIT (OUTPATIENT)
Dept: OBGYN | Age: 56
End: 2021-04-28
Payer: COMMERCIAL

## 2021-04-28 ENCOUNTER — HOSPITAL ENCOUNTER (OUTPATIENT)
Age: 56
Setting detail: SPECIMEN
Discharge: HOME | End: 2021-04-28
Payer: COMMERCIAL

## 2021-04-28 VITALS
HEART RATE: 74 BPM | DIASTOLIC BLOOD PRESSURE: 74 MMHG | BODY MASS INDEX: 22.21 KG/M2 | HEIGHT: 66 IN | WEIGHT: 138.2 LBS | SYSTOLIC BLOOD PRESSURE: 122 MMHG

## 2021-04-28 DIAGNOSIS — N89.8 DISCHARGE FROM THE VAGINA: ICD-10-CM

## 2021-04-28 DIAGNOSIS — R87.622 LOW GRADE SQUAMOUS INTRAEPITH LESION ON CYTOLOGIC SMEAR VAGINA (LGSIL): ICD-10-CM

## 2021-04-28 DIAGNOSIS — N87.9 CERVICAL DYSPLASIA: Primary | ICD-10-CM

## 2021-04-28 DIAGNOSIS — N87.9 CERVICAL DYSPLASIA: ICD-10-CM

## 2021-04-28 DIAGNOSIS — Z90.710 HISTORY OF HYSTERECTOMY: ICD-10-CM

## 2021-04-28 PROCEDURE — 86403 PARTICLE AGGLUT ANTBDY SCRN: CPT

## 2021-04-28 PROCEDURE — 87624 HPV HI-RISK TYP POOLED RSLT: CPT

## 2021-04-28 PROCEDURE — 87070 CULTURE OTHR SPECIMN AEROBIC: CPT

## 2021-04-28 PROCEDURE — 88175 CYTOPATH C/V AUTO FLUID REDO: CPT

## 2021-04-28 PROCEDURE — 99213 OFFICE O/P EST LOW 20 MIN: CPT | Performed by: OBSTETRICS & GYNECOLOGY

## 2021-04-28 RX ORDER — ESTRADIOL 0.1 MG/G
CREAM VAGINAL
Qty: 42.5 G | Refills: 3 | Status: SHIPPED | OUTPATIENT
Start: 2021-04-28 | End: 2022-05-02 | Stop reason: DRUGHIGH

## 2021-04-28 NOTE — PROGRESS NOTES
Flaquita Abreu  2021    YOB: 1965          The patient was seen today. She is here regarding follow up on LSIL. Pt with HSIL s/p LEEP/ PARKER. [facility] showed no cervical dysplasia. Pt states she has had vaginal dryness. Pt was put on premarin vaginal cream but does not use. Pt was counseled on importance of using cream prior to pap smears. Pt states she is voiding without difficulties with normal BMs . Her bowels are regular and she is voiding without difficulty. HPI:  Flaquita Abreu is a 60 y.o. female       OB History    Para Term  AB Living   3 3 3 0 0 2   SAB TAB Ectopic Molar Multiple Live Births   0 0 0 0 0 3      # Outcome Date GA Lbr Judd/2nd Weight Sex Delivery Anes PTL Lv   3 Term 01 40w0d  6 lb 8 oz (2.948 kg) F Vag-Spont   DAVIDSON      Birth Comments: delivered by dr Ephraim Maldonado      Name: Estevan Harrell   2 Term 84 40w0d  9 lb 6 oz (4.252 kg) M Vag-Spont   DEC      Birth Comments: delivered by dr Luis Carlos Capone      Name: Mariela Gavin   1 Term [birth year] 40w0d  8 lb 8 oz (3.856 kg) F Vag-Spont   DAVIDSON      Birth Comments: delivered by dr Luis Carlos Capone      Name: diane       Past Medical History:   Diagnosis Date    Atypical squamous cell changes of undetermined significance (ASCUS) on vaginal cytology with positive high risk human papilloma virus (HPV) 2019    Back pain, chronic 1981    Basal cell carcinoma     right shoulder    Depression     IBS (irritable bowel syndrome)     Low grade squamous intraepithelial lesion     converting to HGSIL 5/15.     Migraine     PTSD (post-traumatic stress disorder)     Rectocele     Varicose vein        Past Surgical History:   Procedure Laterality Date    CHOLECYSTECTOMY, LAPAROSCOPIC  2006    COLONOSCOPY  2012    COLONOSCOPY  2014    internal hemorrhoids Dr Nonie Burkitt [facility]    Anne Couch UTERUS  2010    ENDOMETRIAL ABLATION      HYSTERECTOMY, TOTAL ABDOMINAL  2015    Due to pap with HGSIL    LEEP      OTHER SURGICAL HISTORY      Schlerotherapy    POSTERIOR CRUCIATE LIGAMENT REPAIR      RECTOCELE REPAIR  2013    UPPER GASTROINTESTINAL ENDOSCOPY  02/15/2006       Family History   Problem Relation Age of Onset    Glaucoma Neg Hx     Diabetes Neg Hx     Cataracts Neg Hx        Social History     Socioeconomic History    Marital status:      Spouse name: Not on file    Number of children: Not on file    Years of education: Not on file    Highest education level: Not on file   Occupational History    Not on file   Social Needs    Financial resource strain: Not on file    Food insecurity     Worry: Not on file     Inability: Not on file    Transportation needs     Medical: Not on file     Non-medical: Not on file   Tobacco Use    Smoking status: Never Smoker    Smokeless tobacco: Never Used   Substance and Sexual Activity    Alcohol use:  Yes     Alcohol/week: 0.0 standard drinks     Comment: SOCIALLY    Drug use: No    Sexual activity: Yes     Partners: Male   Lifestyle    Physical activity     Days per week: Not on file     Minutes per session: Not on file    Stress: Not on file   Relationships    Social connections     Talks on phone: Not on file     Gets together: Not on file     Attends Mandaen service: Not on file     Active member of club or organization: Not on file     Attends meetings of clubs or organizations: Not on file     Relationship status: Not on file    Intimate partner violence     Fear of current or ex partner: Not on file     Emotionally abused: Not on file     Physically abused: Not on file     Forced sexual activity: Not on file   Other Topics Concern    Not on file   Social History Narrative    Not on file         MEDICATIONS:  Current Outpatient Medications   Medication Sig Dispense Refill    estradiol (ESTRACE) 0.1 MG/GM vaginal cream INSERT 1/4 APPLICATORFUL (1GRAM) VAGINALLY 2 TIMES A  WEEK AT BEDTIME 42.5 g 3    escitalopram (Katie Peaalex) 10 MG tablet TAKE 1 TABLET DAILY 90 tablet 1    Multiple Vitamins-Minerals (THERAPEUTIC MULTIVITAMIN-MINERALS) tablet Take 1 tablet by mouth daily      vitamin D (CHOLECALCIFEROL) 1000 UNIT TABS tablet Take 1,000 Units by mouth daily Two tablets daily      folic acid (FOLVITE) 131 MCG tablet Take 800 mcg by mouth daily       No current facility-administered medications for this visit. ALLERGIES:  Allergies as of 04/28/2021 - Review Complete 04/28/2021   Allergen Reaction Noted    Penicillins Rash 09/10/2013    Polycillin-n  [ampicillin] Rash 01/06/2018         REVIEW OF SYSTEMS:       A minimum of an eleven point review of systems was completed. Review Of Systems (11 point):  Constitutional: No fever, chills or malaise; No weight change or fatigue  Head and Eyes: No vision, Headache, Dizziness or trauma in last 12 months  ENT ROS: No hearing, Tinnitis, sinus or taste problems  Hematological and Lymphatic ROS:No Lymphoma, Von Willebrand's, Hemophillia or Bleeding History  Psych ROS: No Depression, Homicidal thoughts,suicidal thoughts, or anxiety  Breast ROS: No prior breast abnormalities or lumps  Respiratory ROS: No SOB, Pneumoniae,Cough, or Pulmonary Embolism History  Cardiovascular ROS: No Chest Pain with Exertion, Palpitations, Syncope, Edema, Arrhythmia  Gastrointestinal ROS: No Indigestion, Heartburn, Nausea, vomiting, Diarrhea, Constipation,or Bowel Changes; No Bloody Stools or melena  Genito-Urinary ROS: No Dysuria, Hematuria or Nocturia. No Urinary Incontinence or Vaginal Discharge  Musculoskeletal ROS: No Arthralgia, Arthritis,Gout,Osteoporosis or Rheumatism  Neurological ROS: No CVA, Migraines, Epilepsy, Seizure Hx, or Limb Weakness  Dermatological ROS: No Rash, Itching, Hives, Mole Changes or Cancer          Blood pressure 122/74, pulse 74, height 5' 6\" (1.676 m), weight 138 lb 3.2 oz (62.7 kg), not currently breastfeeding.     Chaperone for Intimate Exam   Chaperone was offered and accepted as part of the rooming process.  Chaperone: Cathy         Abdomen: Soft non-tender; good bowel sounds. No guarding, rebound or rigidity. No CVA tenderness bilaterally. Extremities: No calf tenderness, DTR 2/4, and No edema bilaterally    Pelvic: Vulva and vagina appear normal. Bimanual exam reveals normal cuff without masses/ lesions. Pt with scant brownish/ yellow discharge- culture done    Diagnostics:  No results found.     Lab Results:  Results for orders placed or performed during the hospital encounter of 03/09/21   CBC Auto Differential   Result Value Ref Range    WBC 3.7 3.5 - 11.3 k/uL    RBC 4.73 3.95 - 5.11 m/uL    Hemoglobin 14.5 11.9 - 15.1 g/dL    Hematocrit 43.8 36.3 - 47.1 %    MCV 92.6 82.6 - 102.9 fL    MCH 30.7 25.2 - 33.5 pg    MCHC 33.1 25.2 - 33.5 g/dL    RDW 11.9 11.8 - 14.4 %    Platelets 013 327 - 610 k/uL    MPV 10.5 8.1 - 13.5 fL    NRBC Automated 0.0 0.0 per 100 WBC    Differential Type NOT REPORTED     Seg Neutrophils 48 36 - 65 %    Lymphocytes 40 24 - 43 %    Monocytes 9 3 - 12 %    Eosinophils % 2 1 - 4 %    Basophils 1 0 - 2 %    Immature Granulocytes 0 0 %    Segs Absolute 1.78 1.50 - 8.10 k/uL    Absolute Lymph # 1.47 1.10 - 3.70 k/uL    Absolute Mono # 0.34 0.10 - 1.20 k/uL    Absolute Eos # 0.07 0.00 - 0.44 k/uL    Basophils Absolute 0.03 0.00 - 0.20 k/uL    Absolute Immature Granulocyte <0.03 0.00 - 0.30 k/uL    WBC Morphology NOT REPORTED     RBC Morphology NOT REPORTED     Platelet Estimate NOT REPORTED    Vitamin D 25 Hydroxy   Result Value Ref Range    Vit D, 25-Hydroxy 32.8 30.0 - 100.0 ng/mL   Comprehensive Metabolic Panel   Result Value Ref Range    Glucose 93 70 - 99 mg/dL    BUN 13 6 - 20 mg/dL    CREATININE 0.80 0.50 - 0.90 mg/dL    Bun/Cre Ratio 16 9 - 20    Calcium 10.1 8.6 - 10.4 mg/dL    Sodium 143 135 - 144 mmol/L    Potassium 5.0 3.7 - 5.3 mmol/L    Chloride 105 98 - 107 mmol/L    CO2 30 20 - 31 mmol/L    Anion Gap 8 (L) 9 - 17 mmol/L    Alkaline patients risks of developing these life threatening blood clots. All patients are encouraged to stop smoking at the time of medical management counseling. Cessation programs were reviewed. The patient was instructed to use barrier contraception for sexually transmitted disease prevention. The life threatening side effect profile was reviewed in detail, this includes but is not limited to shortness of breath, chest pain, severe or persistent headaches, or calf pain. If any of these occur the patient has been instructed to stop using her hormonal based medical management therapy, notify the office, and go to the emergency department or call 911. The patient denied any personal history of blood clots in her leg, lung, or heart and denied any family history of stroke, TIA, sudden cardiac death < 36 y.o.,pulmonary embolism, or deep venous thrombosis. The patient was also counseled on the potentiation of any undiagnosed malignancies that she may have. These malignancies may progress at a more rapid rate when exposed to the patients hormonal therapy. This advancing growth may cause the patient increased morbidity and or mortality. PLAN:  Return in about 1 year (around 4/28/2022) for Annual Exam.  If pap negative repeat 1 year  Repeat Annual every 1 year  Cervical Cytology Evaluation begins at 24years old. If Negative Cytology, Follow-up screening per current guidelines. Counseled on preventative health maintenance follow-up. Orders Placed This Encounter   Procedures    Culture, Genital     Standing Status:   Future     Standing Expiration Date:   4/28/2022    PAP SMEAR     Patient History. Patient has had a hysterectomy.   OBGYN Status: Hysterectomy  Past Surgical History:  2006: CHOLECYSTECTOMY, LAPAROSCOPIC  06/22/2012: COLONOSCOPY  8/25/2014: COLONOSCOPY      Comment:  internal hemorrhoids Dr Edu Oseguera h  01/29/2010: Rosalba Dawson  No date: ENDOMETRIAL ABLATION  08/20/2015:

## 2021-04-30 ENCOUNTER — TELEPHONE (OUTPATIENT)
Dept: OBGYN CLINIC | Age: 56
End: 2021-04-30

## 2021-04-30 NOTE — TELEPHONE ENCOUNTER
----- Message from BO Weiss NP sent at 4/30/2021 11:27 AM EDT -----  + GBS  Cleocin 300 mg PO TID x 10 days

## 2021-05-01 LAB
CULTURE: ABNORMAL
Lab: ABNORMAL
SPECIMEN DESCRIPTION: ABNORMAL

## 2021-05-04 LAB — CYTOLOGY REPORT: NORMAL

## 2021-06-01 ENCOUNTER — TELEPHONE (OUTPATIENT)
Dept: SURGERY | Age: 56
End: 2021-06-01

## 2021-06-03 ENCOUNTER — TELEPHONE (OUTPATIENT)
Dept: SURGERY | Age: 56
End: 2021-06-03

## 2021-06-09 NOTE — TELEPHONE ENCOUNTER
Do you happen to have patient's mail in colonoscopy paperwork? If so can you please scan into media and I can contact patient and schedule colonoscopy. Thanks!

## 2021-06-16 ENCOUNTER — PROCEDURE VISIT (OUTPATIENT)
Dept: OBGYN | Age: 56
End: 2021-06-16
Payer: COMMERCIAL

## 2021-06-16 VITALS
HEIGHT: 67 IN | DIASTOLIC BLOOD PRESSURE: 66 MMHG | BODY MASS INDEX: 22.19 KG/M2 | SYSTOLIC BLOOD PRESSURE: 116 MMHG | WEIGHT: 141.4 LBS | HEART RATE: 71 BPM

## 2021-06-16 DIAGNOSIS — R87.810 ASCUS WITH POSITIVE HIGH RISK HPV CERVICAL: Primary | ICD-10-CM

## 2021-06-16 DIAGNOSIS — Z90.710 HISTORY OF HYSTERECTOMY: ICD-10-CM

## 2021-06-16 DIAGNOSIS — R87.610 ASCUS WITH POSITIVE HIGH RISK HPV CERVICAL: Primary | ICD-10-CM

## 2021-06-16 PROCEDURE — 57452 EXAM OF CERVIX W/SCOPE: CPT | Performed by: OBSTETRICS & GYNECOLOGY

## 2021-06-16 NOTE — PROGRESS NOTES
Topics Concern    Not on file   Social History Narrative    Not on file     Social Determinants of Health     Financial Resource Strain:     Difficulty of Paying Living Expenses:    Food Insecurity:     Worried About Running Out of Food in the Last Year:     [address] in the Last Year:    Transportation Needs:     Lack of Transportation (Medical):  Lack of Transportation (Non-Medical):    Physical Activity:     Days of Exercise per Week:     Minutes of Exercise per Session:    Stress:     Feeling of Stress :    Social Connections:     Frequency of Communication with Friends and Family:     Frequency of Social Gatherings with Friends and Family:     Attends Oriental orthodox Services:     Active Member of Clubs or Organizations:     Attends Club or Organization Meetings:     Marital Status:    Intimate Partner Violence:     Fear of Current or Ex-Partner:     Emotionally Abused:     Physically Abused:     Sexually Abused:        Current Outpatient Medications on File Prior to Visit   Medication Sig Dispense Refill    estradiol (ESTRACE) 0.1 MG/GM vaginal cream INSERT 1/4 APPLICATORFUL (1GRAM) VAGINALLY 2 TIMES A  WEEK AT BEDTIME 42.5 g 3    escitalopram (LEXAPRO) 10 MG tablet TAKE 1 TABLET DAILY 90 tablet 1    Multiple Vitamins-Minerals (THERAPEUTIC MULTIVITAMIN-MINERALS) tablet Take 1 tablet by mouth daily      vitamin D (CHOLECALCIFEROL) 1000 UNIT TABS tablet Take 1,000 Units by mouth daily Two tablets daily      folic acid (FOLVITE) 832 MCG tablet Take 800 mcg by mouth daily       No current facility-administered medications on file prior to visit. Allergies as of 06/16/2021 - Fully Reviewed 06/16/2021   Allergen Reaction Noted    Penicillins Rash 09/10/2013    Polycillin-n  [ampicillin] Rash 01/06/2018           INDICATIONS:   1. ASCUS with positive high risk HPV cervical    2.  [facility] hyste[facility]                   [facility]         HPV:   positive  Other high risk    Birth Control Method: hysterectomy  Abnormal Cytology and Colposcopy History: HSIL    COLPOSCOPIC EXAMINATION:                Blood pressure 116/66, pulse 71, height 5' 6.5\" (1.689 m), weight 141 lb 6.4 oz (64.1 kg), not currently breastfeeding. Chaperone for Intimate Exam   Chaperone was offered and accepted as part of the rooming process.  Chaperone: Noemi Scherer      Gross observations: negative  Satisfactory: Yes   Unsatisfactory: No    (Please use Image Button at the top LEFT of the active MENU PANE to       insert your ( cervical diagram with findings.)   No images are attached to the encounter or orders placed in the encounter. LANDMARKS and ATYPICAL FINDINGS:  TZ = transformation zone   SC = new squamocolumnar junction  NC = Nabothian cyst    ME = immature squamous metaplasia  PO = polyp   AV = atypical vessels  C = condyloma  L = leukoplakia  AW = acetowhite epithelium   P = punctation  MO = mosaicism   LS = decreased Lugols uptake  X = biopsy sites  CA = invasive carcinoma      FINDINGS: The colposcope was utilized on high and low magnification. A plain white light and green filter were  also implemented after 3% of acetic acid was applied to the cervix. There was no Aceto White Epithelium, Mosaic Changes, Punctation, or Irregular Vessels seen. ECC performed:  No    Lugols Iodine Applied:   No       IMPRESSIONS: normal  Biopsy sites: none  After review of pathology from colposcopies done every 3 months. Biopsy showing LSIL. HPV negative for 16/ 18. High risk for other. Assessment:   Diagnosis Orders   1. ASCUS with positive high risk HPV cervical     2.  History of hysterectomy           Patient Active Problem List    Diagnosis Date Noted    Low grade squamous intraepith lesion on cytologic smear vagina (lgsil) 01/13/2021    History of hysterectomy 01/13/2021    Atypical squamous cell changes of undetermined significance (ASCUS) on vaginal cytology with positive high risk human papilloma virus (HPV) 01/22/2019    Varicose veins of both lower extremities     IBS (irritable bowel syndrome)     Myopia of both eyes with astigmatism and presbyopia 05/14/2015    Venous insufficiency (chronic) (peripheral) 01/30/2014    Spider vein, symptomatic 01/30/2014    Rectocele 01/02/2014    Migraine headache 09/10/2013    Dysmenorrhea 09/10/2013    Depression 09/10/2013    Post traumatic stress disorder (PTSD) 09/10/2013    LSIL (low grade squamous intraepithelial lesion) on Pap smear 09/10/2013           PLAN:   1. The patient was given formal restriction guidelines. She is to RTO in 2 weeks. FOR PATIENTS WHO UNDERWENT A BIOPSY-They were instructed to report         any increase in vaginal bleeding, discharge, or any temperature more than      100.4   F. Strict pelvic rest precautions were reviewed with lifting restrictions. 2. If WNL Histopathology or Negative Colposcopic evaluation without Biopsies           and/or ECC then Cytologic Collection/PAP in 6 months.     3. HPV Barrier Recommendations and STD counseling completed      Amna Dempsey DO

## 2021-06-16 NOTE — TELEPHONE ENCOUNTER
Patient stopped at window and would like to schedule colonoscopy in [place], patient scheduled on 7/22/2021. [address] [place] [address]  Dept: [address]: [phone]    Medical History Form for Colonoscopy  Name: Niyah Rodriguez  YOB: 1965  Procedure: Colonoscopy Physician: Dr. Kale Garcia  Chief Complaint (Reason for Procedure): Screening  Who is your Primary Care Physician: Dr. Mateusz Ku    Do you smoke? [] Yes    [x] No      Do you drink alcohol? [x] Yes    [] No    Do you have a family history of colon cancer? [] Yes   [x] No    Have you ever had a colonoscopy before? [x] Yes   [] No  When: 2014  Where:[facility]    In the last six (6) months have you experienced any of the following symptoms? [] Blood from the rectum or stool  [] Abdominal Pain   [] Diarrhea  [] Itching of rectum   [] Vomiting  [] Constipation   [] Black stools  [] Bloating   [] Mucous in your stool  [] [place]   [] Change in bowel habits    Do you have allergies? [x] Yes  If yes, please list: PCN and Polycillin  [] No    Do you take Warfarin, Coumadin, Plavix, Eliquis, Xarelto, or aspirin OR do you take a medication that thins your blood?   [] Yes  [x] No    Please list all of your medications including over-the-counter and herbal supplements  Estradiol cream    escitalopram 10 mg daily    Multivitamin daily    Vitamin D daily    Folic acid daily          List your past surgical procedures    Cholecystectomy 2006 Rectocele repair   Colonoscopy 2012 and 2014 EGD   D and C    ablation    Total hysterectomy    schlerotherapy       Medical History  Do you have any history of:  [] None [] Heart Disease [] Hypertension  [] Diabetes [] Seizures [] Respiratory/Asthma  [] Sleep Apnea [] G.E.R.D [] Blood Disorder  [] Vascular Disease [] Depression    List the medical problems you are being treated for    HPV PTSD   Back pain rectocele   Basal cell carcinoma of right shoulder Varicose vein   depression    IBS    migraines

## 2021-07-21 NOTE — H&P
Subjective   Ce Bonner is a 60 y.o. female who presents today for colonoscopy. Pt had last colonoscopy in 2014 and was recommended to have repeat in 5 years. Denies any recent changes in bowel movements, blood per rectum, melena or unintended weight loss. No reported family hx of colon cancer. Past Medical History:   Diagnosis Date    Atypical squamous cell changes of undetermined significance (ASCUS) on vaginal cytology with positive high risk human papilloma virus (HPV) 1/22/2019    Back pain, chronic 1981    Basal cell carcinoma     right shoulder    Depression     IBS (irritable bowel syndrome)     Low grade squamous intraepithelial lesion     converting to HGSIL 5/15.  Migraine     PTSD (post-traumatic stress disorder)     Rectocele     Varicose vein        Past Surgical History:   Procedure Laterality Date    CHOLECYSTECTOMY, LAPAROSCOPIC  2006    COLONOSCOPY  06/22/2012    COLONOSCOPY  8/25/2014    internal hemorrhoids Dr [facility]rand [facility]   Allie Chuckie  01/29/2010    ENDOMETRIAL ABLATION      HYSTERECTOMY, TOTAL ABDOMINAL  08/20/2015    Due to pap with HGSIL    LEEP      OTHER SURGICAL HISTORY      Schlerotherapy    POSTERIOR CRUCIATE LIGAMENT REPAIR      RECTOCELE REPAIR  2013    UPPER GASTROINTESTINAL ENDOSCOPY  02/15/2006       No current facility-administered medications for this encounter.      Current Outpatient Medications   Medication Sig Dispense Refill    estradiol (ESTRACE) 0.1 MG/GM vaginal cream INSERT 1/4 APPLICATORFUL (1GRAM) VAGINALLY 2 TIMES A  WEEK AT BEDTIME 42.5 g 3    escitalopram (LEXAPRO) 10 MG tablet TAKE 1 TABLET DAILY 90 tablet 1    Multiple Vitamins-Minerals (THERAPEUTIC MULTIVITAMIN-MINERALS) tablet Take 1 tablet by mouth daily      vitamin D (CHOLECALCIFEROL) 1000 UNIT TABS tablet Take 1,000 Units by mouth daily Two tablets daily      folic acid (FOLVITE) 141 MCG tablet Take 800 mcg by mouth daily         Allergies   Allergen Reactions    Penicillins Rash     In past known as \"polycillin\"    Polycillin-N  [Ampicillin] Rash       Family History   Problem Relation Age of Onset    Glaucoma Neg Hx     Diabetes Neg Hx     Cataracts Neg Hx        Social History     Socioeconomic History    Marital status:      Spouse name: Not on file    Number of children: Not on file    Years of education: Not on file    Highest education level: Not on file   Occupational History    Not on file   Tobacco Use    Smoking status: Never Smoker    Smokeless tobacco: Never Used   Vaping Use    Vaping Use: Never used   Substance and Sexual Activity    Alcohol use: Yes     Alcohol/week: 0.0 standard drinks     Comment: SOCIALLY    Drug use: No    Sexual activity: Yes     Partners: Male   Other Topics Concern    Not on file   Social History Narrative    Not on file     Social Determinants of Health     Financial Resource Strain:     Difficulty of Paying Living Expenses:    Food Insecurity:     Worried About Running Out of Food in the Last Year:     [address] in the Last Year:    Transportation Needs:     Lack of Transportation (Medical):  Lack of Transportation (Non-Medical):    Physical Activity:     Days of Exercise per Week:     Minutes of Exercise per Session:    Stress:     Feeling of Stress :    Social Connections:     Frequency of Communication with Friends and Family:     Frequency of Social Gatherings with Friends and Family:     Attends [employer] Services:     Active Member of Clubs or Organizations:     Attends Club or Organization Meetings:     Marital Status:    Intimate Partner Violence:     Fear of Current or Ex-Partner:     Emotionally Abused:     Physically Abused:     Sexually Abused:        ROS:   Review of Systems - Negative except as noted in HPI      Objective   There were no vitals filed for this visit. General:in no apparent distress  Eyes: No gross abnormalities.   Ears, Nose, Throat: hearing grossly normal bilaterally  Neck: neck supple and non tender without mass  Lungs: clear to auscultation without wheezes or rales   Heart: S1S2, no mumurs, RRR  Abdomen: soft, nontender, no HSM, no guarding, no rebound, no masses  Extremity: negative  Neuro: CN II-XII grossly intact      Assessment     1. Screening colonoscopy      Plan     1.  Proceed as planned    Electronically signed by Gerald Liu DO on 7/21/2021 at 6:51 PM      (Please note that portions of this note were completed with a voice recognition program.  Efforts were made to edit the dictations but occasionally words are mis-transcribed.)

## 2021-07-22 ENCOUNTER — ANESTHESIA EVENT (OUTPATIENT)
Dept: OPERATING ROOM | Age: 56
End: 2021-07-22
Payer: COMMERCIAL

## 2021-07-22 ENCOUNTER — ANESTHESIA (OUTPATIENT)
Dept: OPERATING ROOM | Age: 56
End: 2021-07-22
Payer: COMMERCIAL

## 2021-07-22 ENCOUNTER — HOSPITAL ENCOUNTER (OUTPATIENT)
Age: 56
Setting detail: OUTPATIENT SURGERY
Discharge: HOME | End: 2021-07-22
Attending: SURGERY | Admitting: SURGERY
Payer: COMMERCIAL

## 2021-07-22 VITALS
OXYGEN SATURATION: 100 % | BODY MASS INDEX: 21.88 KG/M2 | HEIGHT: 67 IN | SYSTOLIC BLOOD PRESSURE: 109 MMHG | RESPIRATION RATE: 16 BRPM | DIASTOLIC BLOOD PRESSURE: 64 MMHG | WEIGHT: 139.4 LBS | HEART RATE: 59 BPM | TEMPERATURE: 97.8 F

## 2021-07-22 VITALS — DIASTOLIC BLOOD PRESSURE: 53 MMHG | OXYGEN SATURATION: 100 % | SYSTOLIC BLOOD PRESSURE: 80 MMHG

## 2021-07-22 PROCEDURE — 2709999900 HC NON-CHARGEABLE SUPPLY: Performed by: SURGERY

## 2021-07-22 PROCEDURE — 7100000011 HC PHASE II RECOVERY - ADDTL 15 MIN: Performed by: SURGERY

## 2021-07-22 PROCEDURE — 3609027000 HC COLONOSCOPY: Performed by: SURGERY

## 2021-07-22 PROCEDURE — 7100000010 HC PHASE II RECOVERY - FIRST 15 MIN: Performed by: SURGERY

## 2021-07-22 PROCEDURE — 3700000000 HC ANESTHESIA ATTENDED CARE: Performed by: SURGERY

## 2021-07-22 PROCEDURE — 2580000003 HC RX 258: Performed by: SURGERY

## 2021-07-22 PROCEDURE — 2500000003 HC RX 250 WO HCPCS: Performed by: NURSE ANESTHETIST, CERTIFIED REGISTERED

## 2021-07-22 PROCEDURE — 6360000002 HC RX W HCPCS: Performed by: NURSE ANESTHETIST, CERTIFIED REGISTERED

## 2021-07-22 PROCEDURE — 45378 DIAGNOSTIC COLONOSCOPY: CPT | Performed by: SURGERY

## 2021-07-22 PROCEDURE — 3700000001 HC ADD 15 MINUTES (ANESTHESIA): Performed by: SURGERY

## 2021-07-22 RX ORDER — PROPOFOL 10 MG/ML
INJECTION, EMULSION INTRAVENOUS PRN
Status: DISCONTINUED | OUTPATIENT
Start: 2021-07-22 | End: 2021-07-22 | Stop reason: SDUPTHER

## 2021-07-22 RX ORDER — SODIUM CHLORIDE 0.9 % (FLUSH) 0.9 %
10 SYRINGE (ML) INJECTION PRN
Status: DISCONTINUED | OUTPATIENT
Start: 2021-07-22 | End: 2021-07-22 | Stop reason: HOSPADM

## 2021-07-22 RX ORDER — SODIUM CHLORIDE, SODIUM LACTATE, POTASSIUM CHLORIDE, CALCIUM CHLORIDE 600; 310; 30; 20 MG/100ML; MG/100ML; MG/100ML; MG/100ML
INJECTION, SOLUTION INTRAVENOUS CONTINUOUS
Status: DISCONTINUED | OUTPATIENT
Start: 2021-07-22 | End: 2021-07-22 | Stop reason: HOSPADM

## 2021-07-22 RX ORDER — SODIUM CHLORIDE 9 MG/ML
25 INJECTION, SOLUTION INTRAVENOUS PRN
Status: DISCONTINUED | OUTPATIENT
Start: 2021-07-22 | End: 2021-07-22 | Stop reason: HOSPADM

## 2021-07-22 RX ORDER — LIDOCAINE HYDROCHLORIDE 40 MG/ML
INJECTION, SOLUTION RETROBULBAR; TOPICAL PRN
Status: DISCONTINUED | OUTPATIENT
Start: 2021-07-22 | End: 2021-07-22 | Stop reason: SDUPTHER

## 2021-07-22 RX ORDER — SODIUM CHLORIDE 0.9 % (FLUSH) 0.9 %
10 SYRINGE (ML) INJECTION EVERY 12 HOURS SCHEDULED
Status: DISCONTINUED | OUTPATIENT
Start: 2021-07-22 | End: 2021-07-22 | Stop reason: HOSPADM

## 2021-07-22 RX ADMIN — LIDOCAINE HYDROCHLORIDE 120 MG: 40 INJECTION, SOLUTION RETROBULBAR; TOPICAL at 07:29

## 2021-07-22 RX ADMIN — PROPOFOL 400 MG: 10 INJECTION, EMULSION INTRAVENOUS at 07:29

## 2021-07-22 RX ADMIN — SODIUM CHLORIDE, POTASSIUM CHLORIDE, SODIUM LACTATE AND CALCIUM CHLORIDE: 600; 310; 30; 20 INJECTION, SOLUTION INTRAVENOUS at 07:16

## 2021-07-22 ASSESSMENT — PAIN - FUNCTIONAL ASSESSMENT: PAIN_FUNCTIONAL_ASSESSMENT: 0-10

## 2021-07-22 ASSESSMENT — PAIN SCALES - GENERAL
PAINLEVEL_OUTOF10: 3
PAINLEVEL_OUTOF10: 1
PAINLEVEL_OUTOF10: 0

## 2021-07-22 NOTE — OP NOTE
[address]                 [address] [place] [address]                                OPERATIVE REPORT    PATIENT NAME: Shae Lantigua                      :        1965  MED REC NO:   [id number]                             ROOM:  ACCOUNT NO:   [de-identified]                           ADMIT DATE: 2021  PROVIDER:     Judith Orantes    DATE OF PROCEDURE:  2021    SURGEON:  Dr. Judith Orantes. ASSISTANT:  None. PREOPERATIVE DIAGNOSIS:  Screening. POSTOPERATIVE DIAGNOSIS:  Screening. PROCEDURE:  Colonoscopy. ANESTHESIA:  MAC.    ESTIMATED BLOOD LOSS:  Minimal.    FLUIDS:  Per anesthesia record. COMPLICATIONS:  None. SPECIMEN:  None. INDICATIONS FOR PROCEDURE:  The patient is a 20-year-old female who was  referred to my office for repeat screening colonoscopy. Decision was  made to proceed. Prior to the time of the procedure, risks, benefits,  and alternative were explained and consent was obtained. DESCRIPTION OF PROCEDURE:  The patient was brought to endoscopy suite,  kept on preoperative gurney and placed in the left lateral decubitus  position. Monitoring devices were placed. MAC anesthesia was induced. After induction of anesthesia, time-out was performed and correct  patient and procedure were verified. Digital rectal exam was performed  which showed no abnormalities. The Olympus video endoscope was  lubricated, inserted into the patient's rectum which was gently  insufflated with air. Scope was then slowly advanced through colon  under visualization to the level of the cecum which was identified by  appendiceal orifice and ileocecal valve. During advancement of the  scope, the bowel prep was noted to be adequate. Once at the cecum, the  scope was slowly withdrawn with withdrawal time being greater than 7  minutes. During this time, colonic mucosa was carefully inspected.    There were no polyps or masses noted throughout the colon. Colon mucosa  appeared healthy. Once at the rectum, a retroflex view was obtained. No distal rectal abnormalities were appreciated. Scope was then  straightened and removed, and procedure was concluded. At conclusion of  the procedure, the patient was in stable condition and was taken to the  PACU for recovery. PLAN:  At this point, the patient has had two clean colonoscopies after  her initial with polyps found, I think we can go back to 10-year  screening schedule.         Kim Martines    D: 07/22/2021 7:57:37       T: 07/22/2021 8:05:39     [email or web address]  Job#: [id number]     Doc#: [id number]    CC:  Primary Care

## 2021-07-22 NOTE — ANESTHESIA PRE PROCEDURE
Department of Anesthesiology  Preprocedure Note       Name:  Cory Doan   Age:  60 y.o.  :  1965                                          MRN:  [id number]         Date:  2021      Surgeon: Emily Talamantes):  Nell Hooper DO    Procedure: Procedure(s):  v-COLONOSCOPY    Medications prior to admission:   Prior to Admission medications    Medication Sig Start Date End Date Taking? Authorizing Provider   estradiol (ESTRACE) 0.1 MG/GM vaginal cream INSERT  APPLICATORFUL (1GRAM) VAGINALLY 2 TIMES A  WEEK AT BEDTIME 21  Yes Raya Ma DO   escitalopram (LEXAPRO) 10 MG tablet TAKE 1 TABLET DAILY 20  Yes Rasheed Penn MD   Multiple Vitamins-Minerals (THERAPEUTIC MULTIVITAMIN-MINERALS) tablet Take 1 tablet by mouth daily    Historical Provider, MD   vitamin D (CHOLECALCIFEROL) 1000 UNIT TABS tablet Take 1,000 Units by mouth daily Two tablets daily    Historical Provider, MD   folic acid (FOLVITE) 851 MCG tablet Take 800 mcg by mouth daily    Historical Provider, MD       Current medications:    Current Facility-Administered Medications   Medication Dose Route Frequency Provider Last Rate Last Admin    lactated ringers infusion   Intravenous Continuous Nell Gene, DO        sodium chloride flush 0.9 % injection 10 mL  10 mL Intravenous 2 times per day Nell Gene, DO        sodium chloride flush 0.9 % injection 10 mL  10 mL Intravenous PRN Nell Gene, DO        0.9 % sodium chloride infusion  25 mL Intravenous PRN Nell Gene, DO           Allergies:     Allergies   Allergen Reactions    Penicillins Rash     In past known as \"polycillin\"   Zackary Salm Polycillin-N  [Ampicillin] Rash       Problem List:    Patient Active Problem List   Diagnosis Code    Migraine headache G43.909    Dysmenorrhea N94.6    Depression F32.9    Post traumatic stress disorder (PTSD) F43.10    LSIL (low grade squamous intraepithelial lesion) on Pap smear [id number]    Rectocele N81.6    Venous insufficiency (chronic) (peripheral) I87.2    Spider vein, symptomatic I78.1    Myopia of both eyes with astigmatism and presbyopia H52.13, H52.203, H52.4    Varicose veins of both lower extremities I83.93    IBS (irritable bowel syndrome) K58.9    Atypical squamous cell changes of undetermined significance (ASCUS) on vaginal cytology with positive high risk human papilloma virus (HPV) R87.620, R87.811    Low grade squamous intraepith lesion on cytologic smear vagina (lgsil) R87.622    History of hysterectomy Z90.710       Past Medical History:        Diagnosis Date    Atypical squamous cell changes of undetermined significance (ASCUS) on vaginal cytology with positive high risk human papilloma virus (HPV) 1/22/2019    Back pain, chronic 1981    Basal cell carcinoma     right shoulder    Depression     IBS (irritable bowel syndrome)     Low grade squamous intraepithelial lesion     converting to HGSIL 5/15.  Migraine     PTSD (post-traumatic stress disorder)     Rectocele     Varicose vein        Past Surgical History:        Procedure Laterality Date    CHOLECYSTECTOMY, LAPAROSCOPIC  2006    COLONOSCOPY  06/22/2012    COLONOSCOPY  8/25/2014    internal hemorrhoids Dr Corey Boyd [facility]   Qitio Sport  01/29/2010    ENDOMETRIAL ABLATION      HYSTERECTOMY, TOTAL ABDOMINAL  08/20/2015    Due to pap with HGSIL    LEEP      OTHER SURGICAL HISTORY      Schlerotherapy    POSTERIOR CRUCIATE LIGAMENT REPAIR      RECTOCELE REPAIR  2013    UPPER GASTROINTESTINAL ENDOSCOPY  02/15/2006       Social History:    Social History     Tobacco Use    Smoking status: Never Smoker    Smokeless tobacco: Never Used   Substance Use Topics    Alcohol use:  Yes     Alcohol/week: 0.0 standard drinks     Comment: SOCIALLY                                Counseling given: Not Answered      Vital Signs (Current):   Vitals:    07/22/21 0650   BP: 120/68   Pulse: 72   Resp: 16   Temp: 36.3 °C (97.4 °F)   TempSrc: Temporal   SpO2: 98%   Weight: 139 lb 6.4 oz (63.2 kg)   Height: 5' 7\" (1.702 m)                                              BP Readings from Last 3 Encounters:   07/22/21 120/68   06/16/21 116/66   04/28/21 122/74       NPO Status: Time of last liquid consumption: 2200                        Time of last solid consumption: 1800                        Date of last liquid consumption: 07/21/21                        Date of last solid food consumption: 07/20/21    BMI:   Wt Readings from Last 3 Encounters:   07/22/21 139 lb 6.4 oz (63.2 kg)   06/16/21 141 lb 6.4 oz (64.1 kg)   04/28/21 138 lb 3.2 oz (62.7 kg)     Body mass index is 21.83 kg/m². CBC:   Lab Results   Component Value Date    WBC 3.7 03/09/2021    RBC 4.73 03/09/2021    HGB 14.5 03/09/2021    HCT 43.8 03/09/2021    MCV 92.6 03/09/2021    RDW 11.9 03/09/2021     03/09/2021       CMP:   Lab Results   Component Value Date     03/09/2021    K 5.0 03/09/2021     03/09/2021    CO2 30 03/09/2021    BUN 13 03/09/2021    CREATININE 0.80 03/09/2021    GFRAA >60 03/09/2021    LABGLOM >60 03/09/2021    GLUCOSE 93 03/09/2021    PROT 7.4 03/09/2021    CALCIUM 10.1 03/09/2021    BILITOT 0.40 03/09/2021    ALKPHOS 82 03/09/2021    AST 21 03/09/2021    ALT 15 03/09/2021       POC Tests: No results for input(s): POCGLU, POCNA, POCK, POCCL, POCBUN, POCHEMO, POCHCT in the last 72 hours.     Coags: No results found for: PROTIME, INR, APTT    HCG (If Applicable): No results found for: PREGTESTUR, PREGSERUM, HCG, HCGQUANT     ABGs: No results found for: PHART, PO2ART, WNY2QTG, AQC8RCE, BEART, S9FOUIBR     Type & Screen (If Applicable):  No results found for: LABABO, LABRH    Drug/Infectious Status (If Applicable):  No results found for: HIV, HEPCAB    COVID-19 Screening (If Applicable): No results found for: COVID19        Anesthesia Evaluation  Patient summary reviewed and Nursing notes reviewed no history of anesthetic

## 2021-07-22 NOTE — ANESTHESIA POSTPROCEDURE EVALUATION
Department of Anesthesiology  Postprocedure Note    Patient: Elon Claude  MRN: [id number]  [place]: 1965  Date of evaluation: 7/22/2021  Time:  7:56 AM     Procedure Summary     Date: 07/22/21 Room / Location: [address]    Anesthesia Start: 9001 Anesthesia Stop:     Procedure: COLONOSCOPY (N/A ) Diagnosis: (screening)    Surgeons: Melinda Frausto DO Responsible Provider: BO Moreland CRNA    Anesthesia Type: general, TIVA ASA Status: 1          Anesthesia Type: No value filed. Ulises Phase I: Ulises Score: 10    Ulises Phase II:      Last vitals: Reviewed and per EMR flowsheets.        Anesthesia Post Evaluation    Patient location during evaluation: PACU  Patient participation: complete - patient participated  Level of consciousness: awake and alert  Pain score: 0  Airway patency: patent  Nausea & Vomiting: no nausea and no vomiting  Complications: no  Cardiovascular status: blood pressure returned to baseline and hemodynamically stable  Respiratory status: acceptable  Hydration status: euvolemic

## 2021-09-02 ENCOUNTER — OFFICE VISIT (OUTPATIENT)
Dept: FAMILY MEDICINE CLINIC | Age: 56
End: 2021-09-02
Payer: COMMERCIAL

## 2021-09-02 VITALS
HEART RATE: 72 BPM | SYSTOLIC BLOOD PRESSURE: 128 MMHG | BODY MASS INDEX: 22.6 KG/M2 | WEIGHT: 144 LBS | DIASTOLIC BLOOD PRESSURE: 72 MMHG | HEIGHT: 67 IN

## 2021-09-02 DIAGNOSIS — Z90.710 HISTORY OF HYSTERECTOMY: ICD-10-CM

## 2021-09-02 DIAGNOSIS — R87.613 HGSIL (HIGH GRADE SQUAMOUS INTRAEPITHELIAL LESION) ON PAP SMEAR OF CERVIX: ICD-10-CM

## 2021-09-02 DIAGNOSIS — I83.93 ASYMPTOMATIC VARICOSE VEINS OF BOTH LOWER EXTREMITIES: ICD-10-CM

## 2021-09-02 DIAGNOSIS — F33.42 RECURRENT MAJOR DEPRESSIVE DISORDER, IN FULL REMISSION (HCC): Primary | ICD-10-CM

## 2021-09-02 DIAGNOSIS — E55.9 VITAMIN D DEFICIENCY: ICD-10-CM

## 2021-09-02 DIAGNOSIS — D36.9 ADENOMATOUS POLYP: ICD-10-CM

## 2021-09-02 DIAGNOSIS — F41.9 ANXIETY: ICD-10-CM

## 2021-09-02 DIAGNOSIS — K58.0 IRRITABLE BOWEL SYNDROME WITH DIARRHEA: ICD-10-CM

## 2021-09-02 PROCEDURE — 99214 OFFICE O/P EST MOD 30 MIN: CPT | Performed by: FAMILY MEDICINE

## 2021-09-02 RX ORDER — ESCITALOPRAM OXALATE 10 MG/1
TABLET ORAL
Qty: 90 TABLET | Refills: 1 | Status: SHIPPED | OUTPATIENT
Start: 2021-09-02 | End: 2022-01-24

## 2021-09-02 ASSESSMENT — PATIENT HEALTH QUESTIONNAIRE - PHQ9
SUM OF ALL RESPONSES TO PHQ9 QUESTIONS 1 & 2: 0
SUM OF ALL RESPONSES TO PHQ QUESTIONS 1-9: 0
2. FEELING DOWN, DEPRESSED OR HOPELESS: 0
SUM OF ALL RESPONSES TO PHQ QUESTIONS 1-9: 0
SUM OF ALL RESPONSES TO PHQ QUESTIONS 1-9: 0
1. LITTLE INTEREST OR PLEASURE IN DOING THINGS: 0

## 2021-09-02 ASSESSMENT — ENCOUNTER SYMPTOMS
GASTROINTESTINAL NEGATIVE: 1
RESPIRATORY NEGATIVE: 1
BACK PAIN: 1
EYES NEGATIVE: 1
ALLERGIC/IMMUNOLOGIC NEGATIVE: 1

## 2021-09-02 NOTE — PROGRESS NOTES
Subjective:      Patient ID: Franchesca Shahid is a 60 y.o. female. HPI   Routine follow up on chronic medical conditions, refills, and review of updated labs. Generally feeling well. No interval panic attacks. Mood seems stable. Generally feeling well at present. No bowel or bladder issues to report. I have reviewed the patient's medical history in detail and updated the computerized patient record. Suspect menopausal related hot flashes in the last year or so. No IBS concerns at present. Migraines fairly quiescent over the interval.       Currently with no lower back pain ongoing. PT/dry needling has helped. She is considering PT again at present. Following ascus pap with ob/gyn dr. Sharyle Dalton. Some colposcopy appointments. Last check looking better. She feels she has gained some wt. Colonoscopy normal 7/22/21. Good for 10 yrs. Past Medical History:   Diagnosis Date    Atypical squamous cell changes of undetermined significance (ASCUS) on vaginal cytology with positive high risk human papilloma virus (HPV) 1/22/2019    Back pain, chronic 1981    Basal cell carcinoma     right shoulder    Depression     IBS (irritable bowel syndrome)     Low grade squamous intraepithelial lesion     converting to HGSIL 5/15.     Migraine     PTSD (post-traumatic stress disorder)     Rectocele     Varicose vein      Past Surgical History:   Procedure Laterality Date    CHOLECYSTECTOMY, LAPAROSCOPIC  2006    COLONOSCOPY  06/22/2012    COLONOSCOPY  8/25/2014    internal hemorrhoids Dr Ashly Carlos    COLONOSCOPY N/A 7/22/2021    COLON[facility]a Con[facility]  01/29/2010    ENDOMETRIAL ABLATION      HYSTERECTOMY, TOTAL ABDOMINAL  08/20/2015    Due to pap with HGSIL    LEEP      OTHER SURGICAL HISTORY      Schlerotherapy    POSTERIOR CRUCIATE LIGAMENT REPAIR      RECTOCELE REPAIR  2013    UPPER GASTROINTESTINAL ENDOSCOPY 02/15/2006     Current Outpatient Medications   Medication Sig Dispense Refill    estradiol (ESTRACE) 0.1 MG/GM vaginal cream INSERT 1/4 APPLICATORFUL (1GRAM) VAGINALLY 2 TIMES A  WEEK AT BEDTIME 42.5 g 3    escitalopram (LEXAPRO) 10 MG tablet TAKE 1 TABLET DAILY 90 tablet 1    Multiple Vitamins-Minerals (THERAPEUTIC MULTIVITAMIN-MINERALS) tablet Take 1 tablet by mouth daily      vitamin D (CHOLECALCIFEROL) 1000 UNIT TABS tablet Take 1,000 Units by mouth daily Two tablets daily      folic acid (FOLVITE) 277 MCG tablet Take 800 mcg by mouth daily       No current facility-administered medications for this visit. No Known Allergies      Review of Systems   Constitutional: Negative. HENT: Negative. Negative for dental problem. Eyes: Negative. Respiratory: Negative. Cardiovascular: Negative. Gastrointestinal: Negative. Endocrine: Negative. Genitourinary: Negative. Musculoskeletal: Positive for back pain. Skin: Negative. Allergic/Immunologic: Negative. Neurological: Negative. Negative for headaches (no interval migraines!). Hematological: Negative. Psychiatric/Behavioral: Negative. The patient is not nervous/anxious (acute episodes reduced at present. ). Objective:   Physical Exam  Constitutional:       General: She is not in acute distress. Appearance: She is well-developed. HENT:      Head: Normocephalic and atraumatic. Right Ear: External ear normal.      Left Ear: External ear normal.      Mouth/Throat:      Pharynx: No oropharyngeal exudate. Eyes:      General: No scleral icterus. Conjunctiva/sclera: Conjunctivae normal.   Neck:      Thyroid: No thyromegaly. Cardiovascular:      Rate and Rhythm: Normal rate and regular rhythm. Heart sounds: Normal heart sounds. No murmur heard. Pulmonary:      Effort: Pulmonary effort is normal. No respiratory distress. Breath sounds: Normal breath sounds. No wheezing.    Abdominal:      General: Bowel sounds are normal. There is no distension. Palpations: Abdomen is soft. Tenderness: There is no abdominal tenderness. There is no rebound. Musculoskeletal:         General: No tenderness. Normal range of motion. Cervical back: Neck supple. Skin:     General: Skin is warm and dry. Findings: No erythema or rash. Neurological:      Mental Status: She is alert and oriented to person, place, and time. Psychiatric:         Behavior: Behavior normal.         Thought Content: Thought content normal.         Judgment: Judgment normal.       /72 (Site: Left Upper Arm, Position: Sitting, Cuff Size: Large Adult)   Pulse 72   Ht 5' 7\" (1.702 m)   Wt 144 lb (65.3 kg)   LMP  (LMP Unknown)   BMI 22.55 kg/m²        Assessment:       Encounter Diagnoses   Name Primary?  Recurrent major depressive disorder, in full remission ([facility] [address] Yes    Asymptomatic varicose veins of both lower extremities     Irritable bowel syndrome with diarrhea     History of hysterectomy     HGSIL (high grade squamous intraepithelial lesion) on Pap smear of cervix     Vitamin D deficiency     Anxiety     Adenomatous polyp              Plan:     depression: doing well at present. Hx of ptsd. Plan to cont. lexapro daily. Working well for her at present. Varicose veins: no significant issues at present. IBS: fairly quiescent at present. No acute concerns. S/p partial hysterectomy for hgsil. Doing well at present. Discussed calcium supplement postmenopausal.   .         mammogram negative 01/05/2019. Anxiety: rare panic attacks acutely. No triggers we can find. Significant nausea associated with episodes. Refilling phenergan for prn use. No significant exacerbations over the last 6 months. more chronic/persistent lower back pain, recurrent. Not tolerating prednisone in the past due to side effects. .  Ibuprofen and PT have helped. Refilling ibuprofen. Will call if PT needed again.

## 2022-01-24 RX ORDER — ESCITALOPRAM OXALATE 10 MG/1
TABLET ORAL
Qty: 90 TABLET | Refills: 3 | Status: SHIPPED | OUTPATIENT
Start: 2022-01-24 | End: 2022-01-24 | Stop reason: SDUPTHER

## 2022-01-24 RX ORDER — ESCITALOPRAM OXALATE 10 MG/1
TABLET ORAL
Qty: 90 TABLET | Refills: 3 | Status: SHIPPED | OUTPATIENT
Start: 2022-01-24 | End: 2022-09-12 | Stop reason: SDUPTHER

## 2022-03-08 ENCOUNTER — TELEPHONE (OUTPATIENT)
Dept: OBGYN | Age: 57
End: 2022-03-08

## 2022-04-07 DIAGNOSIS — Z12.31 VISIT FOR SCREENING MAMMOGRAM: Primary | ICD-10-CM

## 2022-04-12 ENCOUNTER — HOSPITAL ENCOUNTER (OUTPATIENT)
Dept: MAMMOGRAPHY | Age: 57
Discharge: HOME | End: 2022-04-14
Payer: COMMERCIAL

## 2022-04-12 VITALS — BODY MASS INDEX: 21.97 KG/M2 | HEIGHT: 67 IN | WEIGHT: 140 LBS

## 2022-04-12 DIAGNOSIS — Z12.31 VISIT FOR SCREENING MAMMOGRAM: ICD-10-CM

## 2022-04-12 PROCEDURE — 77063 BREAST TOMOSYNTHESIS BI: CPT

## 2022-05-02 ENCOUNTER — OFFICE VISIT (OUTPATIENT)
Dept: OBGYN | Age: 57
End: 2022-05-02
Payer: COMMERCIAL

## 2022-05-02 VITALS
WEIGHT: 143.4 LBS | HEART RATE: 76 BPM | OXYGEN SATURATION: 99 % | BODY MASS INDEX: 22.46 KG/M2 | DIASTOLIC BLOOD PRESSURE: 78 MMHG | SYSTOLIC BLOOD PRESSURE: 126 MMHG

## 2022-05-02 DIAGNOSIS — N95.2 VAGINAL ATROPHY: Primary | ICD-10-CM

## 2022-05-02 PROCEDURE — 99213 OFFICE O/P EST LOW 20 MIN: CPT | Performed by: NURSE PRACTITIONER

## 2022-05-02 RX ORDER — ESTRADIOL 2 MG/1
RING VAGINAL
Qty: 1 EACH | Refills: 3 | Status: SHIPPED | OUTPATIENT
Start: 2022-05-02 | End: 2022-05-09

## 2022-05-02 ASSESSMENT — PATIENT HEALTH QUESTIONNAIRE - PHQ9
SUM OF ALL RESPONSES TO PHQ QUESTIONS 1-9: 0
2. FEELING DOWN, DEPRESSED OR HOPELESS: 0
SUM OF ALL RESPONSES TO PHQ QUESTIONS 1-9: 0
SUM OF ALL RESPONSES TO PHQ9 QUESTIONS 1 & 2: 0
SUM OF ALL RESPONSES TO PHQ QUESTIONS 1-9: 0
1. LITTLE INTEREST OR PLEASURE IN DOING THINGS: 0
SUM OF ALL RESPONSES TO PHQ QUESTIONS 1-9: 0

## 2022-05-02 NOTE — PROGRESS NOTES
Subjective:      Patient ID: Sadia Xiong  is a 60 y. o.   female . Female,    HPI This patient is a 61 y/o    female her to discuss her long clinical history of abnormal cervical and vaginal pap smears. She is a non smoker, in an monogamous relationship, and has vaginal atrophy. She frequently forgets to use her estradiol vaginal cream. She had a total hysterectomy in Aug. 2015 due to her cervical pap smears which had been LGSIL, then became HGSIL. However, she still has been having vaginal irregularities: See the folloowing:    DATE:               Cytology                                                         Procedure  11             ;LGSIL  3/20/12              LGSIL  12              Neg. Pap--HPV not done  13              LGSIL  3/27/14              ASCUS cervista HPV DNA neg. HR HPV  14              LGSIL  5/7/15                HGSIL                                                    Aug. 2015Total hyst  16 Vag. Pap: unsatis. , insuff cell-no HPV done  8/10/16              Neg. No HPV done  17  ASCUS; - 16,18 pos. Other HR HPV  18               NEG. No HPV done  12/10/18  ASCUS;-16,18; pos. Other HR HPV  19   Colpo--bx. Neg. @ 9:00  4/3/19                  Neg. No HPV done  19    ASCUS;-16,18;  Pos. Other HR HPV  19   Colpo; vag. Bx.@ 12:00--LGSIL                                     Bx. @ 9:00 inflammatory  19  Colpo vag. bx @ 12:00  Inflammation  3/10/20    Colpo  Vag. Bx. @ 5:00 inflammation  20                 Neg. No HPV done  20              LGSIL; -16,18; pos other HR HPV  21    Colpo--vag. Bx. @ 9:00 VAIN I;                                             @ 3:00 LGSIL  21     ASCUS; -16,18; pos.  For  Other HR HPV        Objective:   /78 (Site: Left Upper Arm, Position: Sitting, Cuff Size: Medium Adult)   Pulse 76   Wt 143 lb 6.4 oz (65 kg)   LMP  (LMP Unknown)   SpO2 99% Breastfeeding No   BMI 22.46 kg/m²     Any blleeding or pain withintercourse: No    LastPap: 4/28/21 ASCUS; neg#16,#18--pos. Other HR HPV    Last Mammogram: 4/12/22  Neg  Last Dexascan 12/28/12: L/S -0.3; left hip 0.4; fem neck -0.1  Last colonoscopu: 7/27/12--neg. Repeat in 10 years. Do you do self breast exams: Yes    Assessment:   1. Recalcitrant vaginal pap smears; see above   Diagnosis Orders   1. Vaginal atrophy  estradiol (ESTRING) 2 MG vaginal ring           Plan:   1. I consulted with Dr. Chhaya Mann, gyn oncologist in [facility], who reviewed this patient's entire cytology and HPV history. His recommendation is to complete vaginal estrogenation; then repeat pap smear. This was relayed to the patient and she agrees with the plan. Rx. For Estring; use intravaginally x 3 months, then RTO for repeat pap smear. Depending on these results will create a plan.

## 2022-05-06 ENCOUNTER — TELEPHONE (OUTPATIENT)
Dept: OBGYN | Age: 57
End: 2022-05-06

## 2022-05-06 NOTE — TELEPHONE ENCOUNTER
Patient presented to window to find out if Estring has been approved through insurance. Please advise patient ASAP at [phone].

## 2022-05-09 ENCOUNTER — TELEPHONE (OUTPATIENT)
Dept: OBGYN | Age: 57
End: 2022-05-09

## 2022-05-09 RX ORDER — ESTRADIOL 10 UG/1
INSERT VAGINAL
Qty: 28 TABLET | Refills: 1 | Status: SHIPPED
Start: 2022-05-09 | End: 2022-05-12

## 2022-05-11 ENCOUNTER — TELEPHONE (OUTPATIENT)
Dept: OBGYN | Age: 57
End: 2022-05-11

## 2022-05-11 NOTE — TELEPHONE ENCOUNTER
estradoil tabs are running her >$300 for one month  The ring was >$600    Is there anything else we can try

## 2022-05-12 DIAGNOSIS — N95.1 VAGINAL DRYNESS, MENOPAUSAL: Primary | ICD-10-CM

## 2022-05-12 RX ORDER — ESTRADIOL 0.1 MG/G
CREAM VAGINAL
Qty: 42.5 G | Refills: 3 | Status: SHIPPED | OUTPATIENT
Start: 2022-05-12

## 2022-06-09 ENCOUNTER — TELEPHONE (OUTPATIENT)
Dept: FAMILY MEDICINE CLINIC | Age: 57
End: 2022-06-09

## 2022-06-09 RX ORDER — IBUPROFEN 800 MG/1
800 TABLET ORAL EVERY 8 HOURS PRN
Qty: 120 TABLET | Refills: 1 | Status: SHIPPED | OUTPATIENT
Start: 2022-06-09

## 2022-06-09 NOTE — TELEPHONE ENCOUNTER
Pt calling stating her back has been bothering her and questions if she could get some ibuprofen called to [facility], please advise.

## 2022-07-27 ENCOUNTER — TELEPHONE (OUTPATIENT)
Dept: FAMILY MEDICINE CLINIC | Age: 57
End: 2022-07-27

## 2022-07-27 NOTE — TELEPHONE ENCOUNTER
----- Message from Gera Lyle sent at 7/27/2022 10:23 AM EDT -----  Subject: Message to Provider    QUESTIONS  Information for Provider? Savannah Chavarria is scheduled on 8/11 for lower back pain. She is currently taking 800 mg Ibuprofen and is requesting something else   to help with the pain. Please reach out to Savannah Chavarria for advice. ---------------------------------------------------------------------------  --------------  Kaye ROLDAN  [phone]; OK to leave message on voicemail  ---------------------------------------------------------------------------  --------------  SCRIPT ANSWERS  Relationship to Patient?  Self

## 2022-07-27 NOTE — TELEPHONE ENCOUNTER
Spoke to patient and she states ibuprofen is helping the pain, however she does not want to take ibuprofen long term. She is scheduled with Dr. Candido Gibson on 8/11 for a follow up. I advised Dr. Candido Gibson is out of the office and that if she would like to discuss different treatment/pain control, that Urgent Care is available.  Patient verbalized understanding and will come into Urgent Care if her pain worsens

## 2022-08-09 ENCOUNTER — OFFICE VISIT (OUTPATIENT)
Dept: PRIMARY CARE CLINIC | Age: 57
End: 2022-08-09
Payer: COMMERCIAL

## 2022-08-09 VITALS
TEMPERATURE: 100.6 F | DIASTOLIC BLOOD PRESSURE: 82 MMHG | OXYGEN SATURATION: 99 % | RESPIRATION RATE: 18 BRPM | WEIGHT: 142 LBS | HEIGHT: 67 IN | BODY MASS INDEX: 22.29 KG/M2 | SYSTOLIC BLOOD PRESSURE: 124 MMHG | HEART RATE: 99 BPM

## 2022-08-09 DIAGNOSIS — U07.1 COVID-19: Primary | ICD-10-CM

## 2022-08-09 DIAGNOSIS — J02.9 SORE THROAT: ICD-10-CM

## 2022-08-09 LAB
Lab: ABNORMAL
QC PASS/FAIL: ABNORMAL
SARS-COV-2 RDRP RESP QL NAA+PROBE: POSITIVE

## 2022-08-09 PROCEDURE — 99213 OFFICE O/P EST LOW 20 MIN: CPT | Performed by: FAMILY MEDICINE

## 2022-08-09 ASSESSMENT — ENCOUNTER SYMPTOMS
COUGH: 1
SHORTNESS OF BREATH: 0
CONSTIPATION: 0
CHEST TIGHTNESS: 0
CHOKING: 0
VOMITING: 0
NAUSEA: 0
DIARRHEA: 0
SINUS PRESSURE: 1
ABDOMINAL PAIN: 0
WHEEZING: 0
SORE THROAT: 1
TROUBLE SWALLOWING: 0
CHANGE IN BOWEL HABIT: 0

## 2022-08-09 NOTE — PROGRESS NOTES
nirmatrelvir and one 100 mg ritonavir tablets) by mouth every 12 hours for 5 days. 30 tablet 0    ibuprofen (ADVIL;MOTRIN) 800 MG tablet Take 1 tablet by mouth every 8 hours as needed for Pain 120 tablet 1    estradiol (ESTRACE VAGINAL) 0.1 MG/GM vaginal cream Insert 1 gram intravaginally at hs x 2 weeks, then use 2 x week; ie Mondays and fridays. 42.5 g 3    escitalopram (LEXAPRO) 10 MG tablet TAKE 1 TABLET DAILY 90 tablet 3    Multiple Vitamins-Minerals (THERAPEUTIC MULTIVITAMIN-MINERALS) tablet Take 1 tablet by mouth daily      vitamin D (CHOLECALCIFEROL) 1000 UNIT TABS tablet Take 1,000 Units by mouth daily Two tablets daily      folic acid (FOLVITE) 794 MCG tablet Take 800 mcg by mouth daily       No current facility-administered medications for this visit. No Known Allergies    Subjective:     Review of Systems   Constitutional:  Positive for fatigue and fever (tmax 102). Negative for activity change, appetite change and chills. HENT:  Positive for congestion, postnasal drip, sinus pressure and sore throat. Negative for ear pain, sneezing and trouble swallowing. Eyes:  Negative for visual disturbance. Respiratory:  Positive for cough (dry). Negative for choking, chest tightness, shortness of breath and wheezing. Cardiovascular:  Negative for chest pain, palpitations and leg swelling. Gastrointestinal:  Positive for anorexia. Negative for abdominal pain, change in bowel habit, constipation, diarrhea, nausea and vomiting. Musculoskeletal:  Positive for myalgias. Skin:  Negative for rash. Allergic/Immunologic: Negative for environmental allergies. Neurological:  Positive for headaches. Objective:      Physical Exam  Vitals and nursing note reviewed. Constitutional:       General: She is not in acute distress. Appearance: She is well-developed. She is ill-appearing.    HENT:      Right Ear: Tympanic membrane, ear canal and external ear normal.      Left Ear: Tympanic membrane, ear canal and external ear normal.      Nose: Mucosal edema present. Right Sinus: No maxillary sinus tenderness or frontal sinus tenderness. Left Sinus: No maxillary sinus tenderness or frontal sinus tenderness. Mouth/Throat:      Pharynx: No oropharyngeal exudate. Eyes:      Conjunctiva/sclera: Conjunctivae normal.   Cardiovascular:      Rate and Rhythm: Normal rate and regular rhythm. Heart sounds: Normal heart sounds. No murmur heard. Pulmonary:      Effort: Pulmonary effort is normal. No respiratory distress. Breath sounds: Normal breath sounds. No wheezing or rales. Musculoskeletal:      Cervical back: Normal range of motion and neck supple. Lymphadenopathy:      Cervical: No cervical adenopathy. Skin:     General: Skin is warm and dry. Findings: No rash. Neurological:      Mental Status: She is alert and oriented to person, place, and time. Psychiatric:         Behavior: Behavior normal.         Judgment: Judgment normal.     /82 (Site: Left Upper Arm, Position: Sitting, Cuff Size: Medium Adult)   Pulse 99   Temp (!) 100.6 °F (38.1 °C) (Tympanic)   Resp 18   Ht 5' 7\" (1.702 m)   Wt 142 lb (64.4 kg)   LMP  (LMP Unknown)   SpO2 99%   BMI 22.24 kg/m²     Assessment:       Diagnosis Orders   1. COVID-19        2. Sore throat  POCT COVID-19 Rapid, NAAT         Office Visit on 08/09/2022   Component Date Value Ref Range Status    SARS-COV-2, RdRp gene 08/09/2022 Positive (A) Negative Final    Lot Number 08/09/2022 [id number]   Final    QC Pass/Fail 08/09/2022 pass   Final            Plan:       Covid 19: new; she has a viral respiratory infection so I recommended that she use mucinex to help with congestion and cough. I also recommended Flonase and an antihistamine for sinus symptoms. she was also encouraged to use tylenol or ibuprofen for fever. she was instructed to return if there is no improvement or symptoms worsen.  I also sent in tory for her.      Return if symptoms worsen or fail to improve. Orders Placed This Encounter   Procedures    POCT COVID-19 Rapid, NAAT     Order Specific Question:   Is this test for diagnosis or screening? Answer:   Diagnosis of ill patient     Order Specific Question:   Symptomatic for COVID-19 as defined by CDC? Answer:   Yes     Order Specific Question:   Date of Symptom Onset     Answer:   8/7/2022     Order Specific Question:   Hospitalized for COVID-19? Answer:   No     Order Specific Question:   Admitted to ICU for COVID-19? Answer:   No     Order Specific Question:   Employed in healthcare setting? Answer:   No     Order Specific Question:   Resident in a congregate (group) care setting? Answer:   No     Order Specific Question:   Pregnant? Answer:   No     Order Specific Question:   Previously tested for COVID-19? Answer:   No     Orders Placed This Encounter   Medications    nirmatrelvir/ritonavir (PAXLOVID) 20 x 150 MG & 10 x 100MG     Sig: Take 3 tablets (two 150 mg nirmatrelvir and one 100 mg ritonavir tablets) by mouth every 12 hours for 5 days. Dispense:  30 tablet     Refill:  0     Order Specific Question:   Does this patient qualify for COVID-19 antIviral therapy based on criteria for treatment? Answer:   Yes       Patientgiven educational materials - see patient instructions. Discussed use, benefit,and side effects of prescribed medications. All patient questions answered. Ptvoiced understanding. Reviewed health maintenance. Instructed to continue currentmedications, diet and exercise. Patient agreed with treatment plan. Follow up asdirected.      Electronically signed by Nury Carrillo MD on 8/9/2022 at 12:34 PM

## 2022-08-11 ENCOUNTER — TELEPHONE (OUTPATIENT)
Dept: FAMILY MEDICINE CLINIC | Age: 57
End: 2022-08-11

## 2022-08-11 NOTE — TELEPHONE ENCOUNTER
Spoke with patient regarding missed appt 8/11/22. Patient is currently covid positive and in her quarantine period, so she will call back to reschedule.

## 2022-08-30 ENCOUNTER — OFFICE VISIT (OUTPATIENT)
Dept: OBGYN | Age: 57
End: 2022-08-30
Payer: COMMERCIAL

## 2022-08-30 ENCOUNTER — HOSPITAL ENCOUNTER (OUTPATIENT)
Age: 57
Setting detail: SPECIMEN
Discharge: HOME | End: 2022-08-30
Payer: COMMERCIAL

## 2022-08-30 VITALS
HEIGHT: 67 IN | BODY MASS INDEX: 22.47 KG/M2 | WEIGHT: 143.2 LBS | DIASTOLIC BLOOD PRESSURE: 70 MMHG | SYSTOLIC BLOOD PRESSURE: 118 MMHG | HEART RATE: 74 BPM | OXYGEN SATURATION: 99 %

## 2022-08-30 DIAGNOSIS — R87.629 ABNORMAL VAGINAL PAP SMEAR: Primary | ICD-10-CM

## 2022-08-30 DIAGNOSIS — R87.629 ABNORMAL VAGINAL PAP SMEAR: ICD-10-CM

## 2022-08-30 PROCEDURE — 87624 HPV HI-RISK TYP POOLED RSLT: CPT

## 2022-08-30 PROCEDURE — G0145 SCR C/V CYTO,THINLAYER,RESCR: HCPCS

## 2022-08-30 PROCEDURE — 99213 OFFICE O/P EST LOW 20 MIN: CPT | Performed by: NURSE PRACTITIONER

## 2022-08-30 ASSESSMENT — PATIENT HEALTH QUESTIONNAIRE - PHQ9
SUM OF ALL RESPONSES TO PHQ QUESTIONS 1-9: 0
SUM OF ALL RESPONSES TO PHQ QUESTIONS 1-9: 0
1. LITTLE INTEREST OR PLEASURE IN DOING THINGS: 0
SUM OF ALL RESPONSES TO PHQ9 QUESTIONS 1 & 2: 0
SUM OF ALL RESPONSES TO PHQ QUESTIONS 1-9: 0
2. FEELING DOWN, DEPRESSED OR HOPELESS: 0
SUM OF ALL RESPONSES TO PHQ QUESTIONS 1-9: 0

## 2022-08-30 NOTE — PROGRESS NOTES
This is a 58 y/o     female here for a repeat pap smear only. Her last pap done on 21 was ASCUS; neg. 16 and neg 18; pos. For \"other HPV\" See my chart note on 22 with her entire Pap smear records for past 11 years. She's been using the Premarin vaginal cream 2 x weekly, which has helped with her vaginal dryness    OBJECTIVE: vaginal pap smear obtained without any problems    Assessment: chronic abnoral pap smears; neg. Colposcopy findings and neg. HPV for #16 and #18. Plan: 1. Vaginal pap with HPV--will determine management after receiving results            2. I spent 15 min. With pt. Face to face discussing vaginal issues and her hx. Of abnormal vaginal pap smears. [facility]

## 2022-09-12 ENCOUNTER — OFFICE VISIT (OUTPATIENT)
Dept: FAMILY MEDICINE CLINIC | Age: 57
End: 2022-09-12
Payer: COMMERCIAL

## 2022-09-12 VITALS
HEART RATE: 71 BPM | BODY MASS INDEX: 22.91 KG/M2 | OXYGEN SATURATION: 98 % | SYSTOLIC BLOOD PRESSURE: 120 MMHG | HEIGHT: 67 IN | WEIGHT: 146 LBS | DIASTOLIC BLOOD PRESSURE: 80 MMHG

## 2022-09-12 DIAGNOSIS — E55.9 VITAMIN D DEFICIENCY: ICD-10-CM

## 2022-09-12 DIAGNOSIS — F33.42 RECURRENT MAJOR DEPRESSIVE DISORDER, IN FULL REMISSION (HCC): Primary | ICD-10-CM

## 2022-09-12 DIAGNOSIS — Z00.00 ENCOUNTER FOR PREVENTATIVE ADULT HEALTH CARE EXAMINATION: ICD-10-CM

## 2022-09-12 DIAGNOSIS — D36.9 ADENOMATOUS POLYP: ICD-10-CM

## 2022-09-12 DIAGNOSIS — F41.9 ANXIETY: ICD-10-CM

## 2022-09-12 DIAGNOSIS — R87.613 HGSIL (HIGH GRADE SQUAMOUS INTRAEPITHELIAL LESION) ON PAP SMEAR OF CERVIX: ICD-10-CM

## 2022-09-12 DIAGNOSIS — K58.0 IRRITABLE BOWEL SYNDROME WITH DIARRHEA: ICD-10-CM

## 2022-09-12 DIAGNOSIS — Z90.710 HISTORY OF HYSTERECTOMY: ICD-10-CM

## 2022-09-12 DIAGNOSIS — I83.93 ASYMPTOMATIC VARICOSE VEINS OF BOTH LOWER EXTREMITIES: ICD-10-CM

## 2022-09-12 LAB — CYTOLOGY REPORT: NORMAL

## 2022-09-12 PROCEDURE — 99214 OFFICE O/P EST MOD 30 MIN: CPT | Performed by: FAMILY MEDICINE

## 2022-09-12 RX ORDER — ESCITALOPRAM OXALATE 10 MG/1
TABLET ORAL
Qty: 90 TABLET | Refills: 3 | Status: SHIPPED | OUTPATIENT
Start: 2022-09-12

## 2022-09-12 SDOH — ECONOMIC STABILITY: FOOD INSECURITY: WITHIN THE PAST 12 MONTHS, YOU WORRIED THAT YOUR FOOD WOULD RUN OUT BEFORE YOU GOT MONEY TO BUY MORE.: NEVER TRUE

## 2022-09-12 SDOH — ECONOMIC STABILITY: FOOD INSECURITY: WITHIN THE PAST 12 MONTHS, THE FOOD YOU BOUGHT JUST DIDN'T LAST AND YOU DIDN'T HAVE MONEY TO GET MORE.: NEVER TRUE

## 2022-09-12 ASSESSMENT — PATIENT HEALTH QUESTIONNAIRE - PHQ9
5. POOR APPETITE OR OVEREATING: 0
8. MOVING OR SPEAKING SO SLOWLY THAT OTHER PEOPLE COULD HAVE NOTICED. OR THE OPPOSITE, BEING SO FIGETY OR RESTLESS THAT YOU HAVE BEEN MOVING AROUND A LOT MORE THAN USUAL: 0
7. TROUBLE CONCENTRATING ON THINGS, SUCH AS READING THE NEWSPAPER OR WATCHING TELEVISION: 0
4. FEELING TIRED OR HAVING LITTLE ENERGY: 0
2. FEELING DOWN, DEPRESSED OR HOPELESS: 0
9. THOUGHTS THAT YOU WOULD BE BETTER OFF DEAD, OR OF HURTING YOURSELF: 0
SUM OF ALL RESPONSES TO PHQ QUESTIONS 1-9: 0
10. IF YOU CHECKED OFF ANY PROBLEMS, HOW DIFFICULT HAVE THESE PROBLEMS MADE IT FOR YOU TO DO YOUR WORK, TAKE CARE OF THINGS AT HOME, OR GET ALONG WITH OTHER PEOPLE: 0
6. FEELING BAD ABOUT YOURSELF - OR THAT YOU ARE A FAILURE OR HAVE LET YOURSELF OR YOUR FAMILY DOWN: 0
SUM OF ALL RESPONSES TO PHQ QUESTIONS 1-9: 0
SUM OF ALL RESPONSES TO PHQ QUESTIONS 1-9: 0
3. TROUBLE FALLING OR STAYING ASLEEP: 0
SUM OF ALL RESPONSES TO PHQ9 QUESTIONS 1 & 2: 0
SUM OF ALL RESPONSES TO PHQ QUESTIONS 1-9: 0
1. LITTLE INTEREST OR PLEASURE IN DOING THINGS: 0

## 2022-09-12 ASSESSMENT — ENCOUNTER SYMPTOMS
BACK PAIN: 1
ALLERGIC/IMMUNOLOGIC NEGATIVE: 1
GASTROINTESTINAL NEGATIVE: 1
RESPIRATORY NEGATIVE: 1
EYES NEGATIVE: 1

## 2022-09-12 ASSESSMENT — SOCIAL DETERMINANTS OF HEALTH (SDOH): HOW HARD IS IT FOR YOU TO PAY FOR THE VERY BASICS LIKE FOOD, HOUSING, MEDICAL CARE, AND HEATING?: NOT HARD AT ALL

## 2022-09-12 NOTE — PROGRESS NOTES
Subjective:      Patient ID: Damien Loomis is a 59 y.o. female. HPI   Routine follow up on chronic medical conditions, refills, and review of updated labs. Generally feeling well. No interval panic attacks. Mood seems stable. Generally feeling well at present. No bowel or bladder issues to report. I have reviewed the patient's medical history in detail and updated the computerized patient record. Suspect menopausal related hot flashes in the last year or so. No IBS concerns at present. Migraines fairly quiescent over the interval.       Currently with no lower back pain ongoing. PT/dry needling has helped. Had some recurrent pain involving the hip some. Started ibuprofen which helped. Better now with PT stretches she leared. Following ascus pap with ob/gyn dr. Lillian Navarrete. Some colposcopy appointments. Last check looking better. She feels she has gained some wt. Colonoscopy normal 21. Good for 10 yrs. Past Medical History:   Diagnosis Date    Abnormal Pap smear of cervix     Atypical squamous cell changes of undetermined significance (ASCUS) on vaginal cytology with positive high risk human papilloma virus (HPV) 2019    Back pain, chronic 1981    Basal cell carcinoma     right shoulder    Depression     IBS (irritable bowel syndrome)     Low grade squamous intraepithelial lesion     converting to HGSIL 5/15.     Migraine     PTSD (post-traumatic stress disorder)     Rectocele     Varicose vein      Past Surgical History:   Procedure Laterality Date     SECTION      CHOLECYSTECTOMY, LAPAROSCOPIC      COLONOSCOPY  2012    COLONOSCOPY  2014    internal hemorrhoids Dr Dinesh Martino [facility]    COLONOSCOPY N/A 2021    COLONOSCOPY performed by Clair Hoover DO at [address]  2010    ENDOMETRIAL ABLATION      HYSTERECTOMY, TOTAL ABDOMINAL (CERVIX REMOVED)  2015    Due to pap with HGSIL    LEEP OTHER SURGICAL HISTORY      Schlerotherapy    POSTERIOR CRUCIATE LIGAMENT REPAIR      RECTOCELE REPAIR  2013    UPPER GASTROINTESTINAL ENDOSCOPY  02/15/2006     Current Outpatient Medications   Medication Sig Dispense Refill    ibuprofen (ADVIL;MOTRIN) 800 MG tablet Take 1 tablet by mouth every 8 hours as needed for Pain 120 tablet 1    estradiol (ESTRACE VAGINAL) 0.1 MG/GM vaginal cream Insert 1 gram intravaginally at hs x 2 weeks, then use 2 x week; ie Mondays and fridays. 42.5 g 3    escitalopram (LEXAPRO) 10 MG tablet TAKE 1 TABLET DAILY 90 tablet 3    Multiple Vitamins-Minerals (THERAPEUTIC MULTIVITAMIN-MINERALS) tablet Take 1 tablet by mouth daily      vitamin D (CHOLECALCIFEROL) 1000 UNIT TABS tablet Take 1,000 Units by mouth daily Two tablets daily      folic acid (FOLVITE) 814 MCG tablet Take 800 mcg by mouth daily       No current facility-administered medications for this visit. No Known Allergies      Review of Systems   Constitutional: Negative. HENT: Negative. Negative for dental problem. Eyes: Negative. Respiratory: Negative. Cardiovascular: Negative. Gastrointestinal: Negative. Endocrine: Negative. Genitourinary: Negative. Musculoskeletal:  Positive for back pain. Skin: Negative. Allergic/Immunologic: Negative. Neurological: Negative. Negative for headaches (no interval migraines!). Hematological: Negative. Psychiatric/Behavioral: Negative. The patient is not nervous/anxious (acute episodes reduced at present. ). Objective:   Physical Exam  Constitutional:       General: She is not in acute distress. Appearance: She is well-developed. HENT:      Head: Normocephalic and atraumatic. Right Ear: External ear normal.      Left Ear: External ear normal.      Mouth/Throat:      Pharynx: No oropharyngeal exudate. Eyes:      General: No scleral icterus. Conjunctiva/sclera: Conjunctivae normal.   Neck:      Thyroid: No thyromegaly. Cardiovascular:      Rate and Rhythm: Normal rate and regular rhythm. Heart sounds: Normal heart sounds. No murmur heard. Pulmonary:      Effort: Pulmonary effort is normal. No respiratory distress. Breath sounds: Normal breath sounds. No wheezing. Abdominal:      General: Bowel sounds are normal. There is no distension. Palpations: Abdomen is soft. Tenderness: There is no abdominal tenderness. There is no rebound. Musculoskeletal:         General: No tenderness. Normal range of motion. Cervical back: Neck supple. Skin:     General: Skin is warm and dry. Findings: No erythema or rash. Neurological:      Mental Status: She is alert and oriented to person, place, and time. Psychiatric:         Behavior: Behavior normal.         Thought Content: Thought content normal.         Judgment: Judgment normal.     /80   Pulse 71   Ht 5' 7\" (1.702 m)   Wt 146 lb (66.2 kg)   LMP  (LMP Unknown)   SpO2 98%   BMI 22.87 kg/m²        Assessment:       Encounter Diagnoses   Name Primary? Recurrent major depressive disorder, in full [facility] Yes    Asymptomatic varicose veins of both lower extremities     Irritable bowel syndrome with diarrhea     History of hysterectomy     Anxiety     Vitamin D deficiency     Adenomatous polyp     HGSIL (high grade squamous intraepithelial lesion) on Pap smear of cervix                Plan:     depression: doing well at present. Hx of ptsd. Plan to cont. lexapro daily. Working well for her at present. Varicose veins: no significant issues at present. IBS: fairly quiescent at present. No acute concerns. S/p partial hysterectomy for hgsil. Doing well at present. Discussed calcium supplement postmenopausal.   .         mammogram negative 01/05/2019. Anxiety: rare panic attacks acutely. No triggers we can find. Significant nausea associated with episodes. Refilling phenergan for prn use.   No significant exacerbations over the last 6 months. more chronic/persistent lower back pain, recurrent. Not tolerating prednisone in the past due to side effects. .  Ibuprofen and PT have helped. Refilling ibuprofen. Will call if PT needed again. Vitamin D deficiency. Taking daily supplement . Improved to low normal , remains low normal , cont. Supplement. Due for colonoscopy update. Hyperplastic polyp/adenomatous polyp 6/22/2012,  Repeat 8/25/2014: no polyps. On media file, photos, dated 8/29/2014, Dr. Giovanna Hamilton wrote \"repeat colonoscopy in 5 yrs. Updated July 2021: normal.  10 yr follow up. ASCUS pap being followed by ob/gyn. Updated 8/30/22. S/p hysterectomy with ovaries intact. High risk HPV positive again. Awaiting advice     Planning flu shot at work. Updating screening labs.

## 2022-10-01 ENCOUNTER — HOSPITAL ENCOUNTER (OUTPATIENT)
Dept: LAB | Age: 57
Discharge: HOME | End: 2022-10-01
Payer: COMMERCIAL

## 2022-10-01 DIAGNOSIS — E55.9 VITAMIN D DEFICIENCY: ICD-10-CM

## 2022-10-01 DIAGNOSIS — Z00.00 ENCOUNTER FOR PREVENTATIVE ADULT HEALTH CARE EXAMINATION: ICD-10-CM

## 2022-10-01 LAB
ABSOLUTE EOS #: 0.07 K/UL (ref 0–0.44)
ABSOLUTE IMMATURE GRANULOCYTE: <0.03 K/UL (ref 0–0.3)
ABSOLUTE LYMPH #: 1.46 K/UL (ref 1.1–3.7)
ABSOLUTE MONO #: 0.31 K/UL (ref 0.1–1.2)
ALBUMIN SERPL-MCNC: 4.5 G/DL (ref 3.5–5.2)
ALBUMIN/GLOBULIN RATIO: 1.6 (ref 1–2.5)
ALP BLD-CCNC: 86 U/L (ref 35–104)
ALT SERPL-CCNC: 15 U/L (ref 5–33)
ANION GAP SERPL CALCULATED.3IONS-SCNC: 9 MMOL/L (ref 9–17)
AST SERPL-CCNC: 21 U/L
BASOPHILS # BLD: 1 % (ref 0–2)
BASOPHILS ABSOLUTE: 0.04 K/UL (ref 0–0.2)
BILIRUB SERPL-MCNC: 0.3 MG/DL (ref 0.3–1.2)
BUN BLDV-MCNC: 15 MG/DL (ref 6–20)
BUN/CREAT BLD: 20 (ref 9–20)
CALCIUM SERPL-MCNC: 9.9 MG/DL (ref 8.6–10.4)
CHLORIDE BLD-SCNC: 103 MMOL/L (ref 98–107)
CHOLESTEROL/HDL RATIO: 3.2
CHOLESTEROL: 228 MG/DL
CO2: 28 MMOL/L (ref 20–31)
CREAT SERPL-MCNC: 0.76 MG/DL (ref 0.5–0.9)
EOSINOPHILS RELATIVE PERCENT: 2 % (ref 1–4)
GFR AFRICAN AMERICAN: >60 ML/MIN
GFR NON-AFRICAN AMERICAN: >60 ML/MIN
GFR SERPL CREATININE-BSD FRML MDRD: NORMAL ML/MIN/{1.73_M2}
GLUCOSE BLD-MCNC: 91 MG/DL (ref 70–99)
HCT VFR BLD CALC: 40.2 % (ref 36.3–47.1)
HDLC SERPL-MCNC: 71 MG/DL
HEMOGLOBIN: 13.7 G/DL (ref 11.9–15.1)
IMMATURE GRANULOCYTES: 0 %
LDL CHOLESTEROL: 143 MG/DL (ref 0–130)
LYMPHOCYTES # BLD: 39 % (ref 24–43)
MCH RBC QN AUTO: 30.9 PG (ref 25.2–33.5)
MCHC RBC AUTO-ENTMCNC: 34.1 G/DL (ref 25.2–33.5)
MCV RBC AUTO: 90.5 FL (ref 82.6–102.9)
MONOCYTES # BLD: 8 % (ref 3–12)
NRBC AUTOMATED: 0 PER 100 WBC
PDW BLD-RTO: 12.3 % (ref 11.8–14.4)
PLATELET # BLD: 216 K/UL (ref 138–453)
PMV BLD AUTO: 10.5 FL (ref 8.1–13.5)
POTASSIUM SERPL-SCNC: 4.6 MMOL/L (ref 3.7–5.3)
RBC # BLD: 4.44 M/UL (ref 3.95–5.11)
SEG NEUTROPHILS: 50 % (ref 36–65)
SEGMENTED NEUTROPHILS ABSOLUTE COUNT: 1.82 K/UL (ref 1.5–8.1)
SODIUM BLD-SCNC: 140 MMOL/L (ref 135–144)
TOTAL PROTEIN: 7.3 G/DL (ref 6.4–8.3)
TRIGL SERPL-MCNC: 70 MG/DL
VITAMIN D 25-HYDROXY: 34.1 NG/ML
WBC # BLD: 3.7 K/UL (ref 3.5–11.3)

## 2022-10-01 PROCEDURE — 80053 COMPREHEN METABOLIC PANEL: CPT

## 2022-10-01 PROCEDURE — 36415 COLL VENOUS BLD VENIPUNCTURE: CPT

## 2022-10-01 PROCEDURE — 80061 LIPID PANEL: CPT

## 2022-10-01 PROCEDURE — 85025 COMPLETE CBC W/AUTO DIFF WBC: CPT

## 2022-10-01 PROCEDURE — 82306 VITAMIN D 25 HYDROXY: CPT

## 2022-10-08 ENCOUNTER — HOSPITAL ENCOUNTER (OUTPATIENT)
Age: 57
Discharge: HOME | End: 2022-10-08
Payer: COMMERCIAL

## 2022-10-08 ENCOUNTER — OFFICE VISIT (OUTPATIENT)
Dept: PRIMARY CARE CLINIC | Age: 57
End: 2022-10-08
Payer: COMMERCIAL

## 2022-10-08 VITALS
TEMPERATURE: 98.6 F | HEART RATE: 73 BPM | WEIGHT: 144 LBS | HEIGHT: 67 IN | DIASTOLIC BLOOD PRESSURE: 80 MMHG | BODY MASS INDEX: 22.6 KG/M2 | SYSTOLIC BLOOD PRESSURE: 108 MMHG | OXYGEN SATURATION: 98 %

## 2022-10-08 DIAGNOSIS — L02.01 ABSCESS OF FACE: Primary | ICD-10-CM

## 2022-10-08 DIAGNOSIS — L02.01 ABSCESS OF FACE: ICD-10-CM

## 2022-10-08 PROCEDURE — 87205 SMEAR GRAM STAIN: CPT

## 2022-10-08 PROCEDURE — 87186 SC STD MICRODIL/AGAR DIL: CPT

## 2022-10-08 PROCEDURE — 86403 PARTICLE AGGLUT ANTBDY SCRN: CPT

## 2022-10-08 PROCEDURE — 10060 I&D ABSCESS SIMPLE/SINGLE: CPT | Performed by: FAMILY MEDICINE

## 2022-10-08 PROCEDURE — 87070 CULTURE OTHR SPECIMN AEROBIC: CPT

## 2022-10-08 RX ORDER — SULFAMETHOXAZOLE AND TRIMETHOPRIM 800; 160 MG/1; MG/1
1 TABLET ORAL 2 TIMES DAILY
Qty: 20 TABLET | Refills: 0 | Status: SHIPPED | OUTPATIENT
Start: 2022-10-08 | End: 2022-10-18

## 2022-10-08 NOTE — PROGRESS NOTES
[facility]             [address] [facility], [place], [address]                        Telephone [phone]             Fax [phone]     Ami Mahan  :  1965  Age:  59 y.o. MRN:  [id number]  Date of visit:  10/8/2022        Assessment & Plan:    Abscess of face  This was drained as described below. The patient tolerated this well. A swab was sent for culture:  - Culture, Aerobic; Future    Bactrim was prescribed:  - sulfamethoxazole-trimethoprim (BACTRIM DS;SEPTRA DS) 800-160 MG per tablet; Take 1 tablet by mouth 2 times daily for 10 days  Dispense: 20 tablet; Refill: 0    Printed information regarding Skin Abscess was provided to the patient with her After visit Summary. She will be contacted when the ID and sensitivity results are available. Subjective:    Ami Mahan is a 59 y.o. female who presents to [facility] today (10/8/2022) for evaluation of:  Skin Problem (Pt has an abscess on the L side of the face. Been on the face about 1 week. It began as tiny red dot. Pain is throbbing.  )      She states that she noticed a small red dot on the left side of her face approximately a week ago. The area has gotten larger. It is erythematous and painful. There has been no drainage. She denies fever or chills. She reports increased fatigue. Current Outpatient Medications   Medication Sig Dispense Refill    escitalopram (LEXAPRO) 10 MG tablet TAKE 1 TABLET DAILY 90 tablet 3    ibuprofen (ADVIL;MOTRIN) 800 MG tablet Take 1 tablet by mouth every 8 hours as needed for Pain 120 tablet 1    estradiol (ESTRACE VAGINAL) 0.1 MG/GM vaginal cream Insert 1 gram intravaginally at hs x 2 weeks, then use 2 x week; ie  and .  42.5 g 3    Multiple Vitamins-Minerals (THERAPEUTIC MULTIVITAMIN-MINERALS) tablet Take 1 tablet by mouth daily      vitamin D (CHOLECALCIFEROL) 1000 UNIT TABS tablet Take 1,000 Units by mouth daily Two tablets daily      folic acid (FOLVITE) 584 MCG tablet Take 800 mcg by mouth daily       No current facility-administered medications for this visit. She has No Known Allergies. She has the following problem list:  Patient Active Problem List   Diagnosis    Migraine headache    Dysmenorrhea    Depression    Post traumatic stress disorder (PTSD)    LSIL (low grade squamous intraepithelial lesion) on Pap smear    Rectocele    Venous insufficiency (chronic) (peripheral)    Spider vein, symptomatic    Myopia of both eyes with astigmatism and presbyopia    Varicose veins of both lower extremities    IBS (irritable bowel syndrome)    Atypical squamous cell changes of undetermined significance (ASCUS) on vaginal cytology with positive high risk human papilloma virus (HPV)    Low grade squamous intraepith lesion on cytologic smear vagina (lgsil)    History of hysterectomy        She  reports that she has never smoked. She has never used smokeless tobacco.    Objective:    Vitals:    10/08/22 1020   BP: 108/80   Pulse: 73   Temp: 98.6 °F (37 °C)   SpO2: 98%     Body mass index is 22.55 kg/m². Well-nourished, well-developed female, healthy-appearing, alert, cooperative, and in no acute distress. There is a tender mass on the left cheek. The risks and benefits of Incision & Drainage were discussed with the patient, and verbal consent was obtained. The patient was positioned appropriately and the skin over the incision site was prepped with betadine. Local anesthesia was obtained by infiltration using 1% Lidocaine without epinephrine. An incision was then made over the apex of the lesion and a small amount of bloddy material was expressed. A swab was sent for culture. The patient tolerated the procedure well.   Complications:   None        (Please note that portions of this note were completed with a voice-recognition program. Efforts were made to edit the dictation but occasionally words are mis-transcribed.)

## 2022-10-11 LAB
CULTURE: ABNORMAL
DIRECT EXAM: ABNORMAL
SPECIMEN DESCRIPTION: ABNORMAL

## 2022-10-12 ENCOUNTER — TELEPHONE (OUTPATIENT)
Dept: FAMILY MEDICINE CLINIC | Age: 57
End: 2022-10-12

## 2022-10-12 NOTE — TELEPHONE ENCOUNTER
Pt calling in regards to lab results that were mailed to her and her cholesterol readings, please call.

## 2023-06-16 ENCOUNTER — HOSPITAL ENCOUNTER (OUTPATIENT)
Dept: MAMMOGRAPHY | Age: 58
Discharge: HOME | End: 2023-06-18
Payer: COMMERCIAL

## 2023-06-16 DIAGNOSIS — Z12.31 ENCOUNTER FOR SCREENING MAMMOGRAM FOR MALIGNANT NEOPLASM OF BREAST: ICD-10-CM

## 2023-06-16 PROCEDURE — 77063 BREAST TOMOSYNTHESIS BI: CPT

## 2024-06-25 DIAGNOSIS — Z12.31 VISIT FOR SCREENING MAMMOGRAM: Primary | ICD-10-CM

## 2024-07-23 ENCOUNTER — HOSPITAL ENCOUNTER (OUTPATIENT)
Dept: MAMMOGRAPHY | Age: 59
Discharge: HOME | End: 2024-07-25
Payer: COMMERCIAL

## 2024-07-23 VITALS — BODY MASS INDEX: 21.98 KG/M2 | HEIGHT: 68 IN | WEIGHT: 145 LBS

## 2024-07-23 DIAGNOSIS — Z12.31 VISIT FOR SCREENING MAMMOGRAM: ICD-10-CM

## 2024-07-23 PROCEDURE — 77063 BREAST TOMOSYNTHESIS BI: CPT

## 2024-08-13 DIAGNOSIS — Z12.31 VISIT FOR SCREENING MAMMOGRAM: Primary | ICD-10-CM

## 2025-08-11 ENCOUNTER — HOSPITAL ENCOUNTER (OUTPATIENT)
Dept: MAMMOGRAPHY | Age: 60
Discharge: HOME | End: 2025-08-13
Attending: FAMILY MEDICINE
Payer: COMMERCIAL

## 2025-08-11 VITALS — WEIGHT: 150 LBS | BODY MASS INDEX: 23.15 KG/M2

## 2025-08-11 DIAGNOSIS — Z12.31 VISIT FOR SCREENING MAMMOGRAM: ICD-10-CM

## 2025-08-11 PROCEDURE — 77063 BREAST TOMOSYNTHESIS BI: CPT

## (undated) DEVICE — 1200CC GUARDIAN II: Brand: GUARDIAN

## (undated) DEVICE — LINE SAMP O2 6.5FT W/FEMALE CONN F/ADULT CAPNOLINE PLUS

## (undated) DEVICE — MERCY DEFIANCE ENDO KIT: Brand: MEDLINE INDUSTRIES, INC.

## (undated) DEVICE — 60 ML SYRINGE REGULAR TIP: Brand: MONOJECT

## (undated) DEVICE — CONNECTOR TBNG AUX H2O JET DISP FOR OLY 160/180 SER